# Patient Record
Sex: FEMALE | Race: WHITE | Employment: OTHER | URBAN - METROPOLITAN AREA
[De-identification: names, ages, dates, MRNs, and addresses within clinical notes are randomized per-mention and may not be internally consistent; named-entity substitution may affect disease eponyms.]

---

## 2023-02-13 RX ORDER — BETAXOLOL HYDROCHLORIDE 2.8 MG/ML
1 SUSPENSION/ DROPS OPHTHALMIC 2 TIMES DAILY
COMMUNITY

## 2023-02-13 RX ORDER — LORAZEPAM 1 MG/1
TABLET ORAL
COMMUNITY
Start: 2023-01-19

## 2023-02-13 RX ORDER — SUMATRIPTAN 20 MG/1
SPRAY NASAL
COMMUNITY
Start: 2021-05-29

## 2023-02-14 NOTE — H&P
History and Physical    Patient:  Frandy Villarreal  MRN: [id number]  YOB: 1945    CHIEF COMPLAINT:  Bladder tumor    HISTORY OF PRESENT ILLNESS:   The patient is a 69 y.o. female who presents with bladder tumor    Past Medical History:    Past Medical History:   Diagnosis Date    Arrhythmia     Dr. Grant Martin  saw in 2015 for palpitations and feeling like I was going to pass out\". No longer follows w/ cardiology. No cardiac meds. Arthritis     back    Bursitis of left hip     Chronic back pain     lumbar    COVID-19     1/2022  mild, cold-like symptoms    Glaucoma     Hiatal hernia     Hyperlipidemia     Insomnia     Poor historian     TIA (transient ischemic attack)     Pt states,\"I  may have had a TIA many years ago. \" Never followed up and she is unsure. Under care of team     [address] [facility] [place]  back pain    Under care of team     [facility]    Vasovagal near syncope     Pt denies knowing of diagnosis. She saw Dr. Molly Talavera 2015 as outpatient for \"arrhythmia\". No follow up needed per pt. . States had testing done and no f/u    Vitamin D deficiency     Wellness examination     Dr. Maria Esther Mal[place]  last seen 12/31/2022       Past Surgical History:    Past Surgical History:   Procedure Laterality Date    APPENDECTOMY      CHOLECYSTECTOMY      COLONOSCOPY      ENDOSCOPY, COLON, DIAGNOSTIC      EYE SURGERY      FOOT SURGERY Right     twice    HYSTERECTOMY (CERVIX STATUS UNKNOWN)  1978    KNEE CARTILAGE SURGERY Right     SMALL INTESTINE SURGERY      small bowel resection    TONSILLECTOMY         Medications Prior to Admission:    Prior to Admission medications    Medication Sig Start Date End Date Taking?  Authorizing Provider   betaxolol (BETOPTIC-S) 0.25 % ophthalmic suspension Place 1 drop into both eyes 2 times daily   Yes Historical Provider, MD   Latanoprostene Bunod 0.024 % SOLN Apply 1 drop to eye at bedtime Into affected eye   Yes Historical Provider, MD LORazepam (ATIVAN) 1 MG tablet take 1 tablet by mouth once daily if needed for anxiety 1/19/23  Yes Historical Provider, MD   SUMAtriptan (IMITREX) 20 MG/ACT nasal spray INSTILL 1 SPRAY IN 1 NOSTRIL IF NEEDED AND MAY REPEAT IN 2 HOURS IF NEEDED (MAXIMUM OF 2 SPRAYS IN 24 HOURS)            S) 5/29/21  Yes Historical Provider, MD   HYDROcodone-acetaminophen (NORCO) 5-325 MG per tablet Take 1 tablet by mouth every 6 hours as needed for Pain. Pt states she takes 1-2 q 12 hrs prn  Yousuf Sharif    Historical Provider, MD   esomeprazole Magnesium (NEXIUM) 20 MG PACK Take 40 mg by mouth daily    Historical Provider, MD   estrogens, conjugated, (PREMARIN) 0.625 MG tablet Take 0.625 mg by mouth 3 times/week    Historical Provider, MD       Allergies:  Antihistamines, diphenhydramine-type; Biaxin [clarithromycin]; Ceclor [cefaclor]; Ceftin [cefuroxime]; Cleocin [clindamycin]; Erythromycin; Naprelan [naproxen]; Prednisone; Relafen [nabumetone];  Suprax [cefixime]; and Zocor [simvastatin]    Social History:    Social History     Socioeconomic History    Marital status:      Spouse name: Not on file    Number of children: Not on file    Years of education: Not on file    Highest education level: Not on file   Occupational History    Not on file   Tobacco Use    Smoking status: Never    Smokeless tobacco: Never   Vaping Use    Vaping Use: Never used   Substance and Sexual Activity    Alcohol use: No    Drug use: No    Sexual activity: Not on file   Other Topics Concern    Not on file   Social History Narrative    Not on file     Social Determinants of Health     Financial Resource Strain: Not on file   Food Insecurity: Not on file   Transportation Needs: Not on file   Physical Activity: Not on file   Stress: Not on file   Social Connections: Not on file   Intimate Partner Violence: Not on file   Housing Stability: Not on file       Family History:    Family History   Problem Relation Age of Onset Cancer Mother     Arthritis Mother     Stroke Father     Heart Disease Father     Cancer Father        REVIEW OF SYSTEMS:  Constitutional: negative  Eyes: negative  Respiratory: negative  Cardiovascular: negative  Gastrointestinal: negative  Genitourinary: see HPI  Musculoskeletal: negative  Skin: negative   Neurological: negative  Hematological/Lymphatic: negative  Psychological: negative      Physical Exam:      Patient Vitals for the past 24 hrs:   Height Weight   02/13/23 1434 5' 7.5\" (1.715 m) 168 lb (76.2 kg)     Constitutional: Patient in no acute distress; Neuro: alert and oriented to person place and time. Psych: Mood and affect normal.  Lungs: Clear to auscultation bilaterally. Respiratory effort normal  Cardiovascular: Normal S1 and S2. Normal peripheral pulses. Regular rate. Abdomen: Soft, non-tender, non-distended        LABS:   No results for input(s): WBC, HGB, HCT, MCV, PLT in the last 72 hours. No results for input(s): NA, K, CL, CO2, PHOS, BUN, CREATININE, CA in the last 72 hours. No results found for: PSA    Additional Lab/culture results:    Urinalysis: No results for input(s): COLORU, PHUR, LABCAST, WBCUA, RBCUA, MUCUS, TRICHOMONAS, YEAST, BACTERIA, CLARITYU, SPECGRAV, LEUKOCYTESUR, UROBILINOGEN, BILIRUBINUR, BLOODU in the last 72 hours. Invalid input(s): NITRATE, GLUCOSEUKETONESUAMORPHOUS     -----------------------------------------------------------------  Imaging Results:    Assessment and Plan   Impression:    69 y.o. female with bladder tumor    Plan:   OR today for transurethral resection of bladder tumor.       Cami Waterman PA-C  Urology Service   2/15/2023 8:41 AM

## 2023-02-15 ENCOUNTER — HOSPITAL ENCOUNTER (OUTPATIENT)
Age: 78
Setting detail: OUTPATIENT SURGERY
Discharge: HOME | DRG: 668 | End: 2023-02-15
Attending: UROLOGY | Admitting: UROLOGY
Payer: COMMERCIAL

## 2023-02-15 ENCOUNTER — ANESTHESIA EVENT (OUTPATIENT)
Dept: OPERATING ROOM | Age: 78
End: 2023-02-15
Payer: COMMERCIAL

## 2023-02-15 ENCOUNTER — ANESTHESIA (OUTPATIENT)
Dept: OPERATING ROOM | Age: 78
End: 2023-02-15
Payer: COMMERCIAL

## 2023-02-15 VITALS
HEIGHT: 68 IN | SYSTOLIC BLOOD PRESSURE: 135 MMHG | RESPIRATION RATE: 17 BRPM | OXYGEN SATURATION: 94 % | DIASTOLIC BLOOD PRESSURE: 75 MMHG | BODY MASS INDEX: 25.46 KG/M2 | TEMPERATURE: 97.9 F | WEIGHT: 168 LBS | HEART RATE: 73 BPM

## 2023-02-15 DIAGNOSIS — G89.18 POST-OP PAIN: Primary | ICD-10-CM

## 2023-02-15 DIAGNOSIS — C67.9 MALIGNANT NEOPLASM OF URINARY BLADDER, UNSPECIFIED SITE (HCC): ICD-10-CM

## 2023-02-15 PROCEDURE — 3700000001 HC ADD 15 MINUTES (ANESTHESIA): Performed by: UROLOGY

## 2023-02-15 PROCEDURE — 2580000003 HC RX 258: Performed by: NURSE ANESTHETIST, CERTIFIED REGISTERED

## 2023-02-15 PROCEDURE — 3600000004 HC SURGERY LEVEL 4 BASE: Performed by: UROLOGY

## 2023-02-15 PROCEDURE — 0TBB8ZZ EXCISION OF BLADDER, VIA NATURAL OR ARTIFICIAL OPENING ENDOSCOPIC: ICD-10-PCS | Performed by: UROLOGY

## 2023-02-15 PROCEDURE — 7100000010 HC PHASE II RECOVERY - FIRST 15 MIN: Performed by: UROLOGY

## 2023-02-15 PROCEDURE — 7100000000 HC PACU RECOVERY - FIRST 15 MIN: Performed by: UROLOGY

## 2023-02-15 PROCEDURE — 7100000001 HC PACU RECOVERY - ADDTL 15 MIN: Performed by: UROLOGY

## 2023-02-15 PROCEDURE — 2720000010 HC SURG SUPPLY STERILE: Performed by: UROLOGY

## 2023-02-15 PROCEDURE — 3600000014 HC SURGERY LEVEL 4 ADDTL 15MIN: Performed by: UROLOGY

## 2023-02-15 PROCEDURE — 6360000002 HC RX W HCPCS

## 2023-02-15 PROCEDURE — 2500000003 HC RX 250 WO HCPCS: Performed by: NURSE ANESTHETIST, CERTIFIED REGISTERED

## 2023-02-15 PROCEDURE — 3700000000 HC ANESTHESIA ATTENDED CARE: Performed by: UROLOGY

## 2023-02-15 PROCEDURE — 6360000002 HC RX W HCPCS: Performed by: STUDENT IN AN ORGANIZED HEALTH CARE EDUCATION/TRAINING PROGRAM

## 2023-02-15 PROCEDURE — 88307 TISSUE EXAM BY PATHOLOGIST: CPT

## 2023-02-15 PROCEDURE — 6360000002 HC RX W HCPCS: Performed by: NURSE ANESTHETIST, CERTIFIED REGISTERED

## 2023-02-15 PROCEDURE — 2580000003 HC RX 258: Performed by: UROLOGY

## 2023-02-15 PROCEDURE — 2580000003 HC RX 258: Performed by: STUDENT IN AN ORGANIZED HEALTH CARE EDUCATION/TRAINING PROGRAM

## 2023-02-15 PROCEDURE — 2709999900 HC NON-CHARGEABLE SUPPLY: Performed by: UROLOGY

## 2023-02-15 RX ORDER — CIPROFLOXACIN 500 MG/1
500 TABLET, FILM COATED ORAL 2 TIMES DAILY
Qty: 6 TABLET | Refills: 0 | Status: ON HOLD | OUTPATIENT
Start: 2023-02-15 | End: 2023-02-18 | Stop reason: HOSPADM

## 2023-02-15 RX ORDER — HYOSCYAMINE SULFATE 0.12 MG/1
125 TABLET SUBLINGUAL EVERY 4 HOURS PRN
Qty: 30 EACH | Refills: 0 | Status: CANCELLED | OUTPATIENT
Start: 2023-02-15

## 2023-02-15 RX ORDER — ONDANSETRON 2 MG/ML
INJECTION INTRAMUSCULAR; INTRAVENOUS PRN
Status: DISCONTINUED | OUTPATIENT
Start: 2023-02-15 | End: 2023-02-15 | Stop reason: SDUPTHER

## 2023-02-15 RX ORDER — HYDROCODONE BITARTRATE AND ACETAMINOPHEN 5; 325 MG/1; MG/1
1 TABLET ORAL EVERY 4 HOURS PRN
Qty: 8 TABLET | Refills: 0 | Status: ON HOLD | OUTPATIENT
Start: 2023-02-15 | End: 2023-02-18 | Stop reason: HOSPADM

## 2023-02-15 RX ORDER — CIPROFLOXACIN 2 MG/ML
400 INJECTION, SOLUTION INTRAVENOUS ONCE
Status: COMPLETED | OUTPATIENT
Start: 2023-02-15 | End: 2023-02-15

## 2023-02-15 RX ORDER — SODIUM CHLORIDE 0.9 % (FLUSH) 0.9 %
5-40 SYRINGE (ML) INJECTION PRN
Status: DISCONTINUED | OUTPATIENT
Start: 2023-02-15 | End: 2023-02-15 | Stop reason: HOSPADM

## 2023-02-15 RX ORDER — LIDOCAINE HYDROCHLORIDE 10 MG/ML
INJECTION, SOLUTION EPIDURAL; INFILTRATION; INTRACAUDAL; PERINEURAL PRN
Status: DISCONTINUED | OUTPATIENT
Start: 2023-02-15 | End: 2023-02-15 | Stop reason: SDUPTHER

## 2023-02-15 RX ORDER — HYDROCODONE BITARTRATE AND ACETAMINOPHEN 5; 325 MG/1; MG/1
1 TABLET ORAL EVERY 4 HOURS PRN
Qty: 8 TABLET | Refills: 0 | Status: CANCELLED | OUTPATIENT
Start: 2023-02-15 | End: 2023-02-18

## 2023-02-15 RX ORDER — MIDAZOLAM HYDROCHLORIDE 2 MG/2ML
2 INJECTION, SOLUTION INTRAMUSCULAR; INTRAVENOUS ONCE
Status: COMPLETED | OUTPATIENT
Start: 2023-02-15 | End: 2023-02-15

## 2023-02-15 RX ORDER — SODIUM CHLORIDE, SODIUM LACTATE, POTASSIUM CHLORIDE, CALCIUM CHLORIDE 600; 310; 30; 20 MG/100ML; MG/100ML; MG/100ML; MG/100ML
INJECTION, SOLUTION INTRAVENOUS CONTINUOUS
Status: DISCONTINUED | OUTPATIENT
Start: 2023-02-15 | End: 2023-02-15 | Stop reason: HOSPADM

## 2023-02-15 RX ORDER — ONDANSETRON 2 MG/ML
4 INJECTION INTRAMUSCULAR; INTRAVENOUS
Status: DISCONTINUED | OUTPATIENT
Start: 2023-02-15 | End: 2023-02-15 | Stop reason: HOSPADM

## 2023-02-15 RX ORDER — SODIUM CHLORIDE 0.9 % (FLUSH) 0.9 %
5-40 SYRINGE (ML) INJECTION EVERY 12 HOURS SCHEDULED
Status: DISCONTINUED | OUTPATIENT
Start: 2023-02-15 | End: 2023-02-15 | Stop reason: HOSPADM

## 2023-02-15 RX ORDER — MIDAZOLAM HYDROCHLORIDE 1 MG/ML
INJECTION INTRAMUSCULAR; INTRAVENOUS
Status: COMPLETED
Start: 2023-02-15 | End: 2023-02-15

## 2023-02-15 RX ORDER — MAGNESIUM HYDROXIDE 1200 MG/15ML
LIQUID ORAL CONTINUOUS PRN
Status: DISCONTINUED | OUTPATIENT
Start: 2023-02-15 | End: 2023-02-15 | Stop reason: HOSPADM

## 2023-02-15 RX ORDER — SODIUM CHLORIDE, SODIUM LACTATE, POTASSIUM CHLORIDE, CALCIUM CHLORIDE 600; 310; 30; 20 MG/100ML; MG/100ML; MG/100ML; MG/100ML
INJECTION, SOLUTION INTRAVENOUS CONTINUOUS PRN
Status: DISCONTINUED | OUTPATIENT
Start: 2023-02-15 | End: 2023-02-15 | Stop reason: SDUPTHER

## 2023-02-15 RX ORDER — PROPOFOL 10 MG/ML
INJECTION, EMULSION INTRAVENOUS PRN
Status: DISCONTINUED | OUTPATIENT
Start: 2023-02-15 | End: 2023-02-15 | Stop reason: SDUPTHER

## 2023-02-15 RX ORDER — DEXAMETHASONE SODIUM PHOSPHATE 10 MG/ML
INJECTION, SOLUTION INTRAMUSCULAR; INTRAVENOUS PRN
Status: DISCONTINUED | OUTPATIENT
Start: 2023-02-15 | End: 2023-02-15 | Stop reason: SDUPTHER

## 2023-02-15 RX ORDER — HYDRALAZINE HYDROCHLORIDE 20 MG/ML
10 INJECTION INTRAMUSCULAR; INTRAVENOUS
Status: DISCONTINUED | OUTPATIENT
Start: 2023-02-15 | End: 2023-02-15 | Stop reason: HOSPADM

## 2023-02-15 RX ORDER — MAGNESIUM HYDROXIDE 1200 MG/15ML
LIQUID ORAL PRN
Status: DISCONTINUED | OUTPATIENT
Start: 2023-02-15 | End: 2023-02-15 | Stop reason: HOSPADM

## 2023-02-15 RX ORDER — FENTANYL CITRATE 50 UG/ML
INJECTION, SOLUTION INTRAMUSCULAR; INTRAVENOUS PRN
Status: DISCONTINUED | OUTPATIENT
Start: 2023-02-15 | End: 2023-02-15 | Stop reason: SDUPTHER

## 2023-02-15 RX ORDER — SODIUM CHLORIDE 9 MG/ML
INJECTION, SOLUTION INTRAVENOUS PRN
Status: DISCONTINUED | OUTPATIENT
Start: 2023-02-15 | End: 2023-02-15 | Stop reason: HOSPADM

## 2023-02-15 RX ORDER — CIPROFLOXACIN 500 MG/1
500 TABLET, FILM COATED ORAL 2 TIMES DAILY
Qty: 6 TABLET | Refills: 0 | Status: CANCELLED | OUTPATIENT
Start: 2023-02-15 | End: 2023-02-18

## 2023-02-15 RX ADMIN — PROPOFOL 150 MG: 10 INJECTION, EMULSION INTRAVENOUS at 11:19

## 2023-02-15 RX ADMIN — FENTANYL CITRATE 50 MCG: 50 INJECTION, SOLUTION INTRAMUSCULAR; INTRAVENOUS at 11:17

## 2023-02-15 RX ADMIN — Medication 0.5 MG: at 12:36

## 2023-02-15 RX ADMIN — MIDAZOLAM 2 MG: 1 INJECTION INTRAMUSCULAR; INTRAVENOUS at 10:46

## 2023-02-15 RX ADMIN — FENTANYL CITRATE 50 MCG: 50 INJECTION, SOLUTION INTRAMUSCULAR; INTRAVENOUS at 11:19

## 2023-02-15 RX ADMIN — DEXAMETHASONE SODIUM PHOSPHATE 4 MG: 10 INJECTION INTRAMUSCULAR; INTRAVENOUS at 11:28

## 2023-02-15 RX ADMIN — SODIUM CHLORIDE, POTASSIUM CHLORIDE, SODIUM LACTATE AND CALCIUM CHLORIDE: 600; 310; 30; 20 INJECTION, SOLUTION INTRAVENOUS at 11:12

## 2023-02-15 RX ADMIN — CIPROFLOXACIN 400 MG: 2 INJECTION, SOLUTION INTRAVENOUS at 11:37

## 2023-02-15 RX ADMIN — PHENYLEPHRINE HYDROCHLORIDE 100 MCG: 10 INJECTION INTRAVENOUS at 11:41

## 2023-02-15 RX ADMIN — HYDROMORPHONE HYDROCHLORIDE 0.5 MG: 1 INJECTION, SOLUTION INTRAMUSCULAR; INTRAVENOUS; SUBCUTANEOUS at 12:36

## 2023-02-15 RX ADMIN — LIDOCAINE HYDROCHLORIDE 50 MG: 10 INJECTION, SOLUTION EPIDURAL; INFILTRATION; INTRACAUDAL; PERINEURAL at 11:17

## 2023-02-15 RX ADMIN — PHENYLEPHRINE HYDROCHLORIDE 100 MCG: 10 INJECTION INTRAVENOUS at 11:33

## 2023-02-15 RX ADMIN — MIDAZOLAM HYDROCHLORIDE 2 MG: 2 INJECTION, SOLUTION INTRAMUSCULAR; INTRAVENOUS at 10:46

## 2023-02-15 RX ADMIN — PROPOFOL 150 MG: 10 INJECTION, EMULSION INTRAVENOUS at 11:17

## 2023-02-15 RX ADMIN — PHENYLEPHRINE HYDROCHLORIDE 100 MCG: 10 INJECTION INTRAVENOUS at 11:45

## 2023-02-15 RX ADMIN — SODIUM CHLORIDE, POTASSIUM CHLORIDE, SODIUM LACTATE AND CALCIUM CHLORIDE: 600; 310; 30; 20 INJECTION, SOLUTION INTRAVENOUS at 11:15

## 2023-02-15 RX ADMIN — ONDANSETRON 4 MG: 2 INJECTION INTRAMUSCULAR; INTRAVENOUS at 12:03

## 2023-02-15 RX ADMIN — PHENYLEPHRINE HYDROCHLORIDE 100 MCG: 10 INJECTION INTRAVENOUS at 11:30

## 2023-02-15 RX ADMIN — PHENYLEPHRINE HYDROCHLORIDE 40 MCG/MIN: 10 INJECTION INTRAVENOUS at 11:47

## 2023-02-15 ASSESSMENT — PAIN SCALES - GENERAL
PAINLEVEL_OUTOF10: 9
PAINLEVEL_OUTOF10: 5
PAINLEVEL_OUTOF10: 5

## 2023-02-15 ASSESSMENT — PAIN DESCRIPTION - DESCRIPTORS
DESCRIPTORS: DISCOMFORT
DESCRIPTORS: DISCOMFORT

## 2023-02-15 ASSESSMENT — PAIN DESCRIPTION - LOCATION
LOCATION: VAGINA
LOCATION: GROIN

## 2023-02-15 NOTE — ANESTHESIA POSTPROCEDURE EVALUATION
Department of Anesthesiology  Postprocedure Note    Patient: Chris Holland  MRN: [id number]  YOB: 1945  Date of evaluation: 2/15/2023      Procedure Summary     Date: 02/15/23 Room / Location: [address]    Anesthesia Start: 6074 Anesthesia Stop: 7803    Procedure: CYSTOSCOPY TUR  BLADDER TUMOR Diagnosis:       Malignant neoplasm of urinary bladder, unspecified site [facility])      (BLADDER CANCER)    Surgeons: Son Alicea MD Responsible Provider:     Anesthesia Type: general ASA Status: 3          Anesthesia Type: No value filed.     Shelbie Phase I: Shelbie Score: 10    Shelbie Phase II:        Anesthesia Post Evaluation    Patient location during evaluation: bedside  Patient participation: complete - patient participated  Level of consciousness: awake  Airway patency: patent  Nausea & Vomiting: no nausea and no vomiting  Complications: no  Cardiovascular status: blood pressure returned to baseline  Respiratory status: acceptable  Hydration status: euvolemic  Comments: BP (!) 178/84   Pulse 90   Temp 98.6 °F (37 °C) (Temporal)   Resp 20   Ht 5' 7.5\" (1.715 m)   Wt 168 lb (76.2 kg)   SpO2 100%   BMI 25.92 kg/m²

## 2023-02-15 NOTE — ANESTHESIA PRE PROCEDURE
Department of Anesthesiology  Preprocedure Note       Name:  Paige Heath   Age:  69 y.o.  :  1945                                          MRN:  [id number]         Date:  2/15/2023      Surgeon: Lori Briscoe):  Polly Singh MD    Procedure: Procedure(s):  CYSTOSCOPY TUR  BLADDER TUMOR    Medications prior to admission:   Prior to Admission medications    Medication Sig Start Date End Date Taking? Authorizing Provider   betaxolol (BETOPTIC-S) 0.25 % ophthalmic suspension Place 1 drop into both eyes 2 times daily   Yes Historical Provider, MD   Latanoprostene Bunod 0.024 % SOLN Apply 1 drop to eye at bedtime Into affected eye   Yes Historical Provider, MD   LORazepam (ATIVAN) 1 MG tablet take 1 tablet by mouth once daily if needed for anxiety 23  Yes Historical Provider, MD   SUMAtriptan (IMITREX) 20 MG/ACT nasal spray INSTILL 1 SPRAY IN 1 NOSTRIL IF NEEDED AND MAY REPEAT IN 2 HOURS IF NEEDED (MAXIMUM OF 2 SPRAYS IN 24 HOURS)            S) 21  Yes Historical Provider, MD   HYDROcodone-acetaminophen (NORCO) 5-325 MG per tablet Take 1 tablet by mouth every 6 hours as needed for Pain. Pt states she takes 1-2 q 12 hrs prn  Zackary Maxwell    Historical Provider, MD   esomeprazole Magnesium (NEXIUM) 20 MG PACK Take 40 mg by mouth daily    Historical Provider, MD   estrogens, conjugated, (PREMARIN) 0.625 MG tablet Take 0.625 mg by mouth 3 times/week    Historical Provider, MD       Current medications:    No current facility-administered medications for this encounter.      Current Outpatient Medications   Medication Sig Dispense Refill    betaxolol (BETOPTIC-S) 0.25 % ophthalmic suspension Place 1 drop into both eyes 2 times daily      Latanoprostene Bunod 0.024 % SOLN Apply 1 drop to eye at bedtime Into affected eye      LORazepam (ATIVAN) 1 MG tablet take 1 tablet by mouth once daily if needed for anxiety      SUMAtriptan (IMITREX) 20 MG/ACT nasal spray INSTILL 1 SPRAY IN 1 NOSTRIL IF NEEDED AND MAY REPEAT IN 2 HOURS IF NEEDED (MAXIMUM OF 2 SPRAYS IN 24 HOURS)            S)      HYDROcodone-acetaminophen (NORCO) 5-325 MG per tablet Take 1 tablet by mouth every 6 hours as needed for Pain. Pt states she takes 1-2 q 12 hrs prn  Dartha Phlegm Dr. Anderson Ou      esomeprazole Magnesium (NEXIUM) 20 MG PACK Take 40 mg by mouth daily      estrogens, conjugated, (PREMARIN) 0.625 MG tablet Take 0.625 mg by mouth 3 times/week         Allergies: Allergies   Allergen Reactions    Antihistamines, Diphenhydramine-Type Other (See Comments)     Bp drops and I pass out    Biaxin [Clarithromycin] Hives     Abdominal pain    Ceclor [Cefaclor]      Hives and abdominal pain    Ceftin [Cefuroxime] Other (See Comments)     Hives and abdominal pain    Cleocin [Clindamycin] Other (See Comments)     Hives and abdominal pain    Erythromycin      Hives and abdominal pain    Naprelan [Naproxen] Hives    Prednisone Other (See Comments)     Hives and abdominal pain    Relafen [Nabumetone] Other (See Comments)     Hives and abdominal pain    Suprax [Cefixime] Other (See Comments)     Hives and abdominal pain    Zocor [Simvastatin] Other (See Comments)     Abdominal pain       Problem List:  There is no problem list on file for this patient. Past Medical History:        Diagnosis Date    Arrhythmia     Dr. Eden Cartwright  saw in 2015 for palpitations and feeling like I was going to pass out\". No longer follows w/ cardiology. No cardiac meds.  Arthritis     back    Bursitis of left hip     Chronic back pain     lumbar    COVID-19     1/2022  mild, cold-like symptoms    Glaucoma     Hiatal hernia     Hyperlipidemia     Insomnia     Poor historian     TIA (transient ischemic attack)     Pt states,\"I  may have had a TIA many years ago. \" Never followed up and she is unsure.     Under care of team     [facility] Dr. Dr. Monica Mendez  back pain    Under care of team     [facility]    Vasovagal near syncope     Pt denies knowing of diagnosis. She saw Dr. Jenniffer Jones 2015 as outpatient for \"arrhythmia\". No follow up needed per pt. . States had testing done and no f/u    Vitamin D deficiency     Wellness examination     Dr. Luba Kelly, [place]  last seen 12/31/2022       Past Surgical History:        Procedure Laterality Date    APPENDECTOMY      CHOLECYSTECTOMY      COLONOSCOPY      ENDOSCOPY, COLON, DIAGNOSTIC      EYE SURGERY      FOOT SURGERY Right     twice    HYSTERECTOMY (CERVIX STATUS UNKNOWN)  1978    KNEE CARTILAGE SURGERY Right     SMALL INTESTINE SURGERY      small bowel resection    TONSILLECTOMY         Social History:    Social History     Tobacco Use    Smoking status: Never    Smokeless tobacco: Never   Substance Use Topics    Alcohol use: No                                Counseling given: Not Answered      Vital Signs (Current):   Vitals:    02/13/23 1434   Weight: 168 lb (76.2 kg)   Height: 5' 7.5\" (1.715 m)                                              BP Readings from Last 3 Encounters:   No data found for BP       NPO Status:                                                                                 BMI:   Wt Readings from Last 3 Encounters:   No data found for Wt     Body mass index is 25.92 kg/m². CBC: No results found for: WBC, RBC, HGB, HCT, MCV, RDW, PLT    CMP: No results found for: NA, K, CL, CO2, BUN, CREATININE, GFRAA, AGRATIO, LABGLOM, GLUCOSE, GLU, PROT, CALCIUM, BILITOT, ALKPHOS, AST, ALT    POC Tests: No results for input(s): POCGLU, POCNA, POCK, POCCL, POCBUN, POCHEMO, POCHCT in the last 72 hours.     Coags: No results found for: PROTIME, INR, APTT    HCG (If Applicable): No results found for: PREGTESTUR, PREGSERUM, HCG, HCGQUANT     ABGs: No results found for: PHART, PO2ART, OLY7NHT, VVG0BEE, BEART, O1FSHIOA     Type & Screen (If Applicable):  No results found for: LABABO, LABRH    Drug/Infectious Status (If Applicable):  No results found for: HIV, HEPCAB    COVID-19 Screening (If Applicable): No results found for: COVID19        Anesthesia Evaluation  Patient summary reviewed and Nursing notes reviewed no history of anesthetic complications:   Airway: Mallampati: II  TM distance: >3 FB   Neck ROM: full  Mouth opening: > = 3 FB   Dental: normal exam         Pulmonary:Negative Pulmonary ROS breath sounds clear to auscultation                             Cardiovascular:    (+) hyperlipidemia        Rhythm: regular  Rate: normal                    Neuro/Psych:   (+) TIA, headaches:,             GI/Hepatic/Renal:   (+) hiatal hernia, GERD:,           Endo/Other: Negative Endo/Other ROS                    Abdominal:             Vascular: negative vascular ROS. Other Findings:           Anesthesia Plan      general     ASA 3     (LMA)  Induction: intravenous. MIPS: Postoperative opioids intended and Prophylactic antiemetics administered. Anesthetic plan and risks discussed with patient. Plan discussed with CRNA.                     Jonetta Oppenheim, MD   2/15/2023

## 2023-02-15 NOTE — DISCHARGE INSTRUCTIONS
Transurethral resection of Bladder Tumor: You may see blood in the urine after the procedure. This should resolve over the next couple days. Please stay hydrated. If the blood in the urine becomes significant, and doesn't improve to a clear/pink appearance, please call. You may experience frequency/urgency of urination after the procedure. We expect these symptoms to improve over the next couple weeks. Tylenol for pain control  Patient ok to discharge home in good condition  No heavy lifting, >10 lbs for today  Patient should avoid strenuous activity for today  Patient should walk moderately at home  Patient ok to shower   Patient may resume diet as tolerated  Patient should take Rx as directed  No driving while on narcotics  Please call attending physician or hospital  with questions  Call or Present to ED if fever (> 101F), intractable nausea vomiting or pain. Pt should follow up with Dr. Mark Rachel, call to confirm appointment    Home with manzano catheter. Please teach manzano education and send home with leg and night bag. You may see intermittent blood in the urine while the catheter in place. If the catheter becomes obstructed and needs to be exchanged, please call.

## 2023-02-15 NOTE — PROGRESS NOTES
Gave discharge instructions to pt and family members. All questions answered, verbalized understanding.

## 2023-02-15 NOTE — OP NOTE
Operative Note      Patient: Karena Emerson  YOB: 1945  MRN: [id number]    Date of Procedure: 2/15/2023    Pre-Op Diagnosis: BLADDER CANCER    Post-Op Diagnosis:  Bladder tumor, mediu 2-5 cm        Procedure(s):  CYSTOSCOPY   TUR  BLADDER TUMOR, medium 2-5 cm     Surgeon(s):  Carmen Gray MD    Assistant:   Flakita Thomas   PGY5 URO       Anesthesia: General    Estimated Blood Loss (mL): Minimal    Complications: None    Specimens:   ID Type Source Tests Collected by Time Destination   A : BLADDER TUMOR Tissue Tissue SURGICAL PATHOLOGY Carmen Gray MD 2/15/2023 1151        Implants:  * No implants in log *      Drains: * No LDAs found *    Findings:   Multiple papillary bladder tumors, superficial, collective area of 2 to 5 cm    Indications: Karena Emerson is a 69-year-old female with multiple superficial papillary bladder tumors diagnosed during office cystoscopy. All treatment options were discussed. Risks, benefits, goals, alternatives, possible complications were discussed with patient. Patient elected for above mentioned procedures. Consent was signed. Patient elected to proceed. Details: Patient was brought back to operating room, laid in supine position. EPC cuffs were placed on and functioning prior to induction of anesthesia. Antibiotics were administered. GETA was administered. Patient was positioned in dorsolithotomy position and genitals were prepped and draped in sterile fashion. Time out was performed. We placed visual obturator scope within the urethra and into the bladder. A pan-cystoscopy was performed and showed tumor at the: Just lateral to the left ureteral orifice, posterior wall, dome of the bladder. We switched to a resectoscope. We dissected the tumor superficially making sure not to go deep. For the smaller lesions, we fulgurated. we then obtained adequate hemostasis. The specimen were removed for histopathological examination. She tolerated the procedure well. Dr. Erich Coleman was present and scrubbed in for all critical portions of the procedure  Plan/Follow up:  In 1 to 2 weeks in office for discussion of pathology    Electronically signed by Rex Villagomez MD on 2/15/2023 at 12:08 PM

## 2023-02-16 ENCOUNTER — HOSPITAL ENCOUNTER (INPATIENT)
Age: 78
LOS: 3 days | Discharge: HOME | DRG: 668 | End: 2023-02-19
Attending: HOSPITALIST | Admitting: FAMILY MEDICINE
Payer: COMMERCIAL

## 2023-02-16 DIAGNOSIS — N32.89 EXTRAPERITONEAL RUPTURE OF BLADDER: Primary | ICD-10-CM

## 2023-02-16 PROBLEM — T81.9XXD: Status: ACTIVE | Noted: 2023-02-16

## 2023-02-16 PROBLEM — T81.9XXA POSTOPERATIVE OR SURGICAL COMPLICATION, INITIAL ENCOUNTER: Status: ACTIVE | Noted: 2023-02-16

## 2023-02-16 PROCEDURE — 2060000000 HC ICU INTERMEDIATE R&B

## 2023-02-16 PROCEDURE — 1200000000 HC SEMI PRIVATE

## 2023-02-16 RX ORDER — ONDANSETRON 4 MG/1
4 TABLET, ORALLY DISINTEGRATING ORAL EVERY 8 HOURS PRN
Status: DISCONTINUED | OUTPATIENT
Start: 2023-02-16 | End: 2023-02-19 | Stop reason: HOSPADM

## 2023-02-16 RX ORDER — SODIUM CHLORIDE 9 MG/ML
INJECTION, SOLUTION INTRAVENOUS CONTINUOUS
Status: DISCONTINUED | OUTPATIENT
Start: 2023-02-17 | End: 2023-02-19 | Stop reason: HOSPADM

## 2023-02-16 RX ORDER — ACETAMINOPHEN 650 MG/1
650 SUPPOSITORY RECTAL EVERY 6 HOURS PRN
Status: DISCONTINUED | OUTPATIENT
Start: 2023-02-16 | End: 2023-02-19 | Stop reason: HOSPADM

## 2023-02-16 RX ORDER — ENOXAPARIN SODIUM 100 MG/ML
40 INJECTION SUBCUTANEOUS DAILY
Status: DISCONTINUED | OUTPATIENT
Start: 2023-02-17 | End: 2023-02-19 | Stop reason: HOSPADM

## 2023-02-16 RX ORDER — ACETAMINOPHEN 325 MG/1
650 TABLET ORAL EVERY 6 HOURS PRN
Status: DISCONTINUED | OUTPATIENT
Start: 2023-02-16 | End: 2023-02-19 | Stop reason: HOSPADM

## 2023-02-16 RX ORDER — SODIUM CHLORIDE 0.9 % (FLUSH) 0.9 %
5-40 SYRINGE (ML) INJECTION PRN
Status: DISCONTINUED | OUTPATIENT
Start: 2023-02-16 | End: 2023-02-19 | Stop reason: HOSPADM

## 2023-02-16 RX ORDER — SODIUM CHLORIDE 0.9 % (FLUSH) 0.9 %
5-40 SYRINGE (ML) INJECTION EVERY 12 HOURS SCHEDULED
Status: DISCONTINUED | OUTPATIENT
Start: 2023-02-17 | End: 2023-02-19 | Stop reason: HOSPADM

## 2023-02-16 RX ORDER — POLYETHYLENE GLYCOL 3350 17 G/17G
17 POWDER, FOR SOLUTION ORAL DAILY PRN
Status: DISCONTINUED | OUTPATIENT
Start: 2023-02-16 | End: 2023-02-18

## 2023-02-16 RX ORDER — SODIUM CHLORIDE 9 MG/ML
INJECTION, SOLUTION INTRAVENOUS PRN
Status: DISCONTINUED | OUTPATIENT
Start: 2023-02-16 | End: 2023-02-19 | Stop reason: HOSPADM

## 2023-02-16 RX ORDER — ONDANSETRON 2 MG/ML
4 INJECTION INTRAMUSCULAR; INTRAVENOUS EVERY 6 HOURS PRN
Status: DISCONTINUED | OUTPATIENT
Start: 2023-02-16 | End: 2023-02-19 | Stop reason: HOSPADM

## 2023-02-17 ENCOUNTER — APPOINTMENT (OUTPATIENT)
Dept: CT IMAGING | Age: 78
DRG: 668 | End: 2023-02-17
Attending: HOSPITALIST
Payer: COMMERCIAL

## 2023-02-17 PROBLEM — T81.9XXA POSTOPERATIVE OR SURGICAL COMPLICATION, INITIAL ENCOUNTER: Status: RESOLVED | Noted: 2023-02-16 | Resolved: 2023-02-17

## 2023-02-17 PROBLEM — K56.609 SMALL BOWEL OBSTRUCTION (HCC): Status: ACTIVE | Noted: 2023-02-17

## 2023-02-17 PROBLEM — N32.89 INTRAPERITONEAL RUPTURE OF BLADDER: Status: ACTIVE | Noted: 2023-02-17

## 2023-02-17 PROBLEM — R33.8 ACUTE URINARY RETENTION: Status: ACTIVE | Noted: 2023-02-17

## 2023-02-17 PROBLEM — T81.9XXD: Status: RESOLVED | Noted: 2023-02-16 | Resolved: 2023-02-17

## 2023-02-17 LAB
ABSOLUTE EOS #: 0.05 K/UL (ref 0–0.44)
ABSOLUTE IMMATURE GRANULOCYTE: 0.04 K/UL (ref 0–0.3)
ABSOLUTE LYMPH #: 1.25 K/UL (ref 1.1–3.7)
ABSOLUTE MONO #: 1.16 K/UL (ref 0.1–1.2)
ALBUMIN SERPL-MCNC: 3.2 G/DL (ref 3.5–5.2)
ALBUMIN/GLOBULIN RATIO: 1.4 (ref 1–2.5)
ALP SERPL-CCNC: 90 U/L (ref 35–104)
ALT SERPL-CCNC: 48 U/L (ref 5–33)
ANION GAP SERPL CALCULATED.3IONS-SCNC: 13 MMOL/L (ref 9–17)
AST SERPL-CCNC: 61 U/L
BASOPHILS # BLD: 0 % (ref 0–2)
BASOPHILS ABSOLUTE: 0.03 K/UL (ref 0–0.2)
BILIRUB SERPL-MCNC: 0.9 MG/DL (ref 0.3–1.2)
BUN SERPL-MCNC: 15 MG/DL (ref 8–23)
CALCIUM SERPL-MCNC: 8.5 MG/DL (ref 8.6–10.4)
CHLORIDE SERPL-SCNC: 98 MMOL/L (ref 98–107)
CO2 SERPL-SCNC: 20 MMOL/L (ref 20–31)
CREAT SERPL-MCNC: 1.54 MG/DL (ref 0.5–0.9)
CRP SERPL HS-MCNC: 72.7 MG/L (ref 0–5)
EOSINOPHILS RELATIVE PERCENT: 0 % (ref 1–4)
GFR SERPL CREATININE-BSD FRML MDRD: 35 ML/MIN/1.73M2
GLUCOSE SERPL-MCNC: 122 MG/DL (ref 70–99)
HCT VFR BLD AUTO: 36.9 % (ref 36.3–47.1)
HGB BLD-MCNC: 12 G/DL (ref 11.9–15.1)
IMMATURE GRANULOCYTES: 0 %
INR PPP: 1
LIPASE SERPL-CCNC: 15 U/L (ref 13–60)
LYMPHOCYTES # BLD: 10 % (ref 24–43)
MCH RBC QN AUTO: 31.7 PG (ref 25.2–33.5)
MCHC RBC AUTO-ENTMCNC: 32.5 G/DL (ref 28.4–34.8)
MCV RBC AUTO: 97.4 FL (ref 82.6–102.9)
MONOCYTES # BLD: 10 % (ref 3–12)
NRBC AUTOMATED: 0 PER 100 WBC
PARTIAL THROMBOPLASTIN TIME: 24.6 SEC (ref 20.5–30.5)
PDW BLD-RTO: 12.8 % (ref 11.8–14.4)
PHOSPHATE SERPL-MCNC: 3.8 MG/DL (ref 2.6–4.5)
PLATELET # BLD AUTO: 280 K/UL (ref 138–453)
PMV BLD AUTO: 9.6 FL (ref 8.1–13.5)
POTASSIUM SERPL-SCNC: 3.7 MMOL/L (ref 3.7–5.3)
PROT SERPL-MCNC: 5.5 G/DL (ref 6.4–8.3)
PROTHROMBIN TIME: 10.4 SEC (ref 9.1–12.3)
RBC # BLD: 3.79 M/UL (ref 3.95–5.11)
SEG NEUTROPHILS: 80 % (ref 36–65)
SEGMENTED NEUTROPHILS ABSOLUTE COUNT: 9.66 K/UL (ref 1.5–8.1)
SODIUM SERPL-SCNC: 131 MMOL/L (ref 135–144)
SURGICAL PATHOLOGY REPORT: NORMAL
WBC # BLD AUTO: 12.2 K/UL (ref 3.5–11.3)

## 2023-02-17 PROCEDURE — 6370000000 HC RX 637 (ALT 250 FOR IP): Performed by: INTERNAL MEDICINE

## 2023-02-17 PROCEDURE — 2700000000 HC OXYGEN THERAPY PER DAY

## 2023-02-17 PROCEDURE — 99223 1ST HOSP IP/OBS HIGH 75: CPT | Performed by: INTERNAL MEDICINE

## 2023-02-17 PROCEDURE — 36415 COLL VENOUS BLD VENIPUNCTURE: CPT

## 2023-02-17 PROCEDURE — 72193 CT PELVIS W/DYE: CPT

## 2023-02-17 PROCEDURE — 85025 COMPLETE CBC W/AUTO DIFF WBC: CPT

## 2023-02-17 PROCEDURE — 80053 COMPREHEN METABOLIC PANEL: CPT

## 2023-02-17 PROCEDURE — 85730 THROMBOPLASTIN TIME PARTIAL: CPT

## 2023-02-17 PROCEDURE — 86140 C-REACTIVE PROTEIN: CPT

## 2023-02-17 PROCEDURE — 6360000002 HC RX W HCPCS: Performed by: NURSE PRACTITIONER

## 2023-02-17 PROCEDURE — 1200000000 HC SEMI PRIVATE

## 2023-02-17 PROCEDURE — 81479 UNLISTED MOLECULAR PATHOLOGY: CPT

## 2023-02-17 PROCEDURE — 94761 N-INVAS EAR/PLS OXIMETRY MLT: CPT

## 2023-02-17 PROCEDURE — 6360000004 HC RX CONTRAST MEDICATION: Performed by: STUDENT IN AN ORGANIZED HEALTH CARE EDUCATION/TRAINING PROGRAM

## 2023-02-17 PROCEDURE — 84100 ASSAY OF PHOSPHORUS: CPT

## 2023-02-17 PROCEDURE — 6360000002 HC RX W HCPCS: Performed by: FAMILY MEDICINE

## 2023-02-17 PROCEDURE — 83520 IMMUNOASSAY QUANT NOS NONAB: CPT

## 2023-02-17 PROCEDURE — 88346 IMFLUOR 1ST 1ANTB STAIN PX: CPT

## 2023-02-17 PROCEDURE — 2580000003 HC RX 258: Performed by: NURSE PRACTITIONER

## 2023-02-17 PROCEDURE — 82397 CHEMILUMINESCENT ASSAY: CPT

## 2023-02-17 PROCEDURE — 88350 IMFLUOR EA ADDL 1ANTB STN PX: CPT

## 2023-02-17 PROCEDURE — 85610 PROTHROMBIN TIME: CPT

## 2023-02-17 PROCEDURE — 83690 ASSAY OF LIPASE: CPT

## 2023-02-17 RX ORDER — GLIMEPIRIDE 2 MG/1
1 TABLET ORAL 2 TIMES DAILY
Status: DISCONTINUED | OUTPATIENT
Start: 2023-02-17 | End: 2023-02-19 | Stop reason: HOSPADM

## 2023-02-17 RX ORDER — HYDROCODONE BITARTRATE AND ACETAMINOPHEN 5; 325 MG/1; MG/1
1 TABLET ORAL EVERY 6 HOURS PRN
Status: DISCONTINUED | OUTPATIENT
Start: 2023-02-17 | End: 2023-02-19 | Stop reason: HOSPADM

## 2023-02-17 RX ORDER — HYDROCODONE BITARTRATE AND ACETAMINOPHEN 5; 325 MG/1; MG/1
1 TABLET ORAL EVERY 4 HOURS PRN
Status: DISCONTINUED | OUTPATIENT
Start: 2023-02-17 | End: 2023-02-19 | Stop reason: HOSPADM

## 2023-02-17 RX ORDER — SUMATRIPTAN 20 MG/1
20 SPRAY NASAL
Status: ACTIVE | OUTPATIENT
Start: 2023-02-17 | End: 2023-02-18

## 2023-02-17 RX ORDER — LORAZEPAM 0.5 MG/1
0.5 TABLET ORAL EVERY 6 HOURS PRN
Status: DISCONTINUED | OUTPATIENT
Start: 2023-02-17 | End: 2023-02-19 | Stop reason: HOSPADM

## 2023-02-17 RX ADMIN — DIATRIZOATE MEGLUMINE 300 ML: 300 INJECTION, SOLUTION INTRAVENOUS at 02:27

## 2023-02-17 RX ADMIN — HYDROMORPHONE HYDROCHLORIDE 0.5 MG: 1 INJECTION, SOLUTION INTRAMUSCULAR; INTRAVENOUS; SUBCUTANEOUS at 15:06

## 2023-02-17 RX ADMIN — SODIUM CHLORIDE: 9 INJECTION, SOLUTION INTRAVENOUS at 00:10

## 2023-02-17 RX ADMIN — PIPERACILLIN AND TAZOBACTAM 3375 MG: 3; .375 INJECTION, POWDER, FOR SOLUTION INTRAVENOUS at 00:54

## 2023-02-17 RX ADMIN — TIMOLOL MALEATE 1 DROP: 2.5 SOLUTION OPHTHALMIC at 11:04

## 2023-02-17 RX ADMIN — ONDANSETRON 4 MG: 2 INJECTION INTRAMUSCULAR; INTRAVENOUS at 02:59

## 2023-02-17 RX ADMIN — PIPERACILLIN AND TAZOBACTAM 3375 MG: 3; .375 INJECTION, POWDER, FOR SOLUTION INTRAVENOUS at 07:54

## 2023-02-17 RX ADMIN — SODIUM CHLORIDE, PRESERVATIVE FREE 10 ML: 5 INJECTION INTRAVENOUS at 20:03

## 2023-02-17 RX ADMIN — SODIUM CHLORIDE, PRESERVATIVE FREE 10 ML: 5 INJECTION INTRAVENOUS at 07:56

## 2023-02-17 RX ADMIN — HYDROMORPHONE HYDROCHLORIDE 1 MG: 1 INJECTION, SOLUTION INTRAMUSCULAR; INTRAVENOUS; SUBCUTANEOUS at 16:44

## 2023-02-17 RX ADMIN — HYDROMORPHONE HYDROCHLORIDE 0.5 MG: 1 INJECTION, SOLUTION INTRAMUSCULAR; INTRAVENOUS; SUBCUTANEOUS at 07:54

## 2023-02-17 RX ADMIN — ENOXAPARIN SODIUM 40 MG: 100 INJECTION SUBCUTANEOUS at 07:55

## 2023-02-17 RX ADMIN — HYDROMORPHONE HYDROCHLORIDE 0.5 MG: 1 INJECTION, SOLUTION INTRAMUSCULAR; INTRAVENOUS; SUBCUTANEOUS at 00:55

## 2023-02-17 RX ADMIN — TIMOLOL MALEATE 1 DROP: 2.5 SOLUTION OPHTHALMIC at 20:03

## 2023-02-17 RX ADMIN — HYDROMORPHONE HYDROCHLORIDE 0.5 MG: 1 INJECTION, SOLUTION INTRAMUSCULAR; INTRAVENOUS; SUBCUTANEOUS at 11:43

## 2023-02-17 SDOH — SOCIAL STABILITY: SOCIAL INSECURITY: WITHIN THE LAST YEAR, HAVE YOU BEEN AFRAID OF YOUR PARTNER OR EX-PARTNER?: NO

## 2023-02-17 SDOH — SOCIAL STABILITY: SOCIAL NETWORK: ARE YOU MARRIED, WIDOWED, DIVORCED, SEPARATED, NEVER MARRIED, OR LIVING WITH A PARTNER?: MARRIED

## 2023-02-17 SDOH — SOCIAL STABILITY: SOCIAL NETWORK: HOW OFTEN DO YOU ATTEND CHURCH OR RELIGIOUS SERVICES?: MORE THAN 4 TIMES PER YEAR

## 2023-02-17 SDOH — ECONOMIC STABILITY: TRANSPORTATION INSECURITY
IN THE PAST 12 MONTHS, HAS THE LACK OF TRANSPORTATION KEPT YOU FROM MEDICAL APPOINTMENTS OR FROM GETTING MEDICATIONS?: NO

## 2023-02-17 SDOH — SOCIAL STABILITY: SOCIAL INSECURITY: WITHIN THE LAST YEAR, HAVE YOU BEEN HUMILIATED OR EMOTIONALLY ABUSED IN OTHER WAYS BY YOUR PARTNER OR EX-PARTNER?: NO

## 2023-02-17 SDOH — HEALTH STABILITY: PHYSICAL HEALTH: ON AVERAGE, HOW MANY DAYS PER WEEK DO YOU ENGAGE IN MODERATE TO STRENUOUS EXERCISE (LIKE A BRISK WALK)?: 6 DAYS

## 2023-02-17 SDOH — ECONOMIC STABILITY: FOOD INSECURITY: WITHIN THE PAST 12 MONTHS, THE FOOD YOU BOUGHT JUST DIDN'T LAST AND YOU DIDN'T HAVE MONEY TO GET MORE.: NEVER TRUE

## 2023-02-17 SDOH — SOCIAL STABILITY: SOCIAL NETWORK: HOW OFTEN DO YOU ATTENT MEETINGS OF THE CLUB OR ORGANIZATION YOU BELONG TO?: NEVER

## 2023-02-17 SDOH — ECONOMIC STABILITY: HOUSING INSECURITY: IN THE LAST 12 MONTHS, HOW MANY PLACES HAVE YOU LIVED?: 1

## 2023-02-17 SDOH — ECONOMIC STABILITY: TRANSPORTATION INSECURITY
IN THE PAST 12 MONTHS, HAS LACK OF TRANSPORTATION KEPT YOU FROM MEETINGS, WORK, OR FROM GETTING THINGS NEEDED FOR DAILY LIVING?: NO

## 2023-02-17 SDOH — SOCIAL STABILITY: SOCIAL NETWORK: HOW OFTEN DO YOU GET TOGETHER WITH FRIENDS OR RELATIVES?: MORE THAN THREE TIMES A WEEK

## 2023-02-17 SDOH — SOCIAL STABILITY: SOCIAL INSECURITY
WITHIN THE LAST YEAR, HAVE YOU BEEN KICKED, HIT, SLAPPED, OR OTHERWISE PHYSICALLY HURT BY YOUR PARTNER OR EX-PARTNER?: NO

## 2023-02-17 SDOH — HEALTH STABILITY: MENTAL HEALTH: HOW OFTEN DO YOU HAVE A DRINK CONTAINING ALCOHOL?: NEVER

## 2023-02-17 SDOH — SOCIAL STABILITY: SOCIAL NETWORK
DO YOU BELONG TO ANY CLUBS OR ORGANIZATIONS SUCH AS CHURCH GROUPS UNIONS, FRATERNAL OR ATHLETIC GROUPS, OR SCHOOL GROUPS?: YES

## 2023-02-17 SDOH — HEALTH STABILITY: MENTAL HEALTH
STRESS IS WHEN SOMEONE FEELS TENSE, NERVOUS, ANXIOUS, OR CAN'T SLEEP AT NIGHT BECAUSE THEIR MIND IS TROUBLED. HOW STRESSED ARE YOU?: NOT AT ALL

## 2023-02-17 SDOH — ECONOMIC STABILITY: INCOME INSECURITY: HOW HARD IS IT FOR YOU TO PAY FOR THE VERY BASICS LIKE FOOD, HOUSING, MEDICAL CARE, AND HEATING?: NOT VERY HARD

## 2023-02-17 SDOH — SOCIAL STABILITY: SOCIAL NETWORK
IN A TYPICAL WEEK, HOW MANY TIMES DO YOU TALK ON THE PHONE WITH FAMILY, FRIENDS, OR NEIGHBORS?: MORE THAN THREE TIMES A WEEK

## 2023-02-17 SDOH — SOCIAL STABILITY: SOCIAL NETWORK: ARE YOU MARRIED, WIDOWED, DIVORCED, SEPARATED, NEVER MARRIED, OR LIVING WITH A PARTNER?: DIVORCED

## 2023-02-17 SDOH — HEALTH STABILITY: PHYSICAL HEALTH: ON AVERAGE, HOW MANY MINUTES DO YOU ENGAGE IN EXERCISE AT THIS LEVEL?: 60 MIN

## 2023-02-17 SDOH — ECONOMIC STABILITY: HOUSING INSECURITY
IN THE LAST 12 MONTHS, WAS THERE A TIME WHEN YOU DID NOT HAVE A STEADY PLACE TO SLEEP OR SLEPT IN A SHELTER (INCLUDING NOW)?: NO

## 2023-02-17 SDOH — SOCIAL STABILITY: SOCIAL INSECURITY
WITHIN THE LAST YEAR, HAVE TO BEEN RAPED OR FORCED TO HAVE ANY KIND OF SEXUAL ACTIVITY BY YOUR PARTNER OR EX-PARTNER?: NO

## 2023-02-17 SDOH — ECONOMIC STABILITY: INCOME INSECURITY: IN THE LAST 12 MONTHS, WAS THERE A TIME WHEN YOU WERE NOT ABLE TO PAY THE MORTGAGE OR RENT ON TIME?: NO

## 2023-02-17 SDOH — ECONOMIC STABILITY: FOOD INSECURITY: WITHIN THE PAST 12 MONTHS, YOU WORRIED THAT YOUR FOOD WOULD RUN OUT BEFORE YOU GOT MONEY TO BUY MORE.: NEVER TRUE

## 2023-02-17 SDOH — HEALTH STABILITY: MENTAL HEALTH: HOW MANY STANDARD DRINKS CONTAINING ALCOHOL DO YOU HAVE ON A TYPICAL DAY?: PATIENT DOES NOT DRINK

## 2023-02-17 ASSESSMENT — PAIN SCALES - GENERAL
PAINLEVEL_OUTOF10: 7
PAINLEVEL_OUTOF10: 8
PAINLEVEL_OUTOF10: 7
PAINLEVEL_OUTOF10: 7
PAINLEVEL_OUTOF10: 2
PAINLEVEL_OUTOF10: 7
PAINLEVEL_OUTOF10: 4
PAINLEVEL_OUTOF10: 7

## 2023-02-17 ASSESSMENT — PAIN DESCRIPTION - LOCATION: LOCATION: ABDOMEN

## 2023-02-17 ASSESSMENT — PAIN DESCRIPTION - ORIENTATION: ORIENTATION: MID

## 2023-02-17 NOTE — FLOWSHEET NOTE
Patient resting comfortably, reports pain 2/10. All vitals stable, no complaint or needs, will continue to monitor.

## 2023-02-17 NOTE — H&P
[facility]  Office: [address], DO, Simón Wright, DO, Zenaida Victor, DO, Elisa Meltoncody Doty, DO, Vance Domínguez MD, Salas Haskins MD, Bijal Sanchez MD, Parker Kraus MD,  Danae Galindo MD, Hunter Shen MD, Igor Hernandez, DO, Ngozi Ochoa MD,  Nora Fermin MD, Lázaro Saldivar MD, Og Guillory DO, Amilcar Wick MD, Naldo Casillas MD, Lisa Carter DO, Austin Courtney MD, Soledad Kruse MD, Meredith Ponce MD, Delma Majano MD, Bibiana Lozano DO, Mimi Maier MD, Linda Chowdhury MD, Rambo Berger, CNP,  Rob Tapia, CNP, Neetu Duran, CNP, José Chowdhury, CNP,  Danielle Gelz, [facility], Maximo Mittal, CNP, Chio Roger, CNP, Em Bustamante, CNP, Heide Casper, CNP, Paola Sanchez, CNP, Lukasz Gonzales PA-C, Dunia Taylor, CNS, Nick Alves, CNP, Shyanne Kyle, [facility]    HISTORY AND PHYSICAL EXAMINATION            Date:   2/17/2023  Patient name:  Dwain Dennison  Date of admission:  2/16/2023 11:50 PM  MRN:   [id number]  Account:  [de-identified]  YOB: 1945  PCP:    Binta Mendoza DO  Room:   2003/2003-01  Code Status:    Full Code    Chief Complaint:     Bladder rupture status post bladder biopsy with acute urinary retention with small bowel obstruction and free urine and peritoneal cavity      History Obtained From:     patient    History of Present Illness:     Dwain Dennison is a 69 y.o. Non- / non  female who presents with No chief complaint on file. and is admitted to the hospital for the management of Postoperative or surgical complication, initial encounter. She gives a history of hysterectomy at age 30 for endometriosis. She had small bowel obstruction which started at age 28. She has had problems of the colon, hematuria and had colonoscopy performed with 1 large polyp diagnosed a mild 6 polyps on 2/2022 with repeat colonoscopy planned. Patient had cystoscopy performed with 2 excisional biopsies 1 day ago. After this she was unable to urinate. She had severe lower abdominal pain 7/10 to 10/10 on the night in the lower abdomen and back of sharp stabbing nature which got worse. She was not eating or drinking. No nausea no vomiting. She had not had no bowel movements for the last 4 days. Prior to this she had diarrhea while on Macrodantin for dysuria for 10 days. She was advised to come to the urology office or go to the nearest emergency room. She went to [facility]. CT of abdomen showed mildly dilated loops in the left abdomen, some loops of proximal jejunum thickened wall, left converse lumbar scoliosis with DJD, scattered free fluid in pelvis around bladder and presacral area and right upper quadrant abdomen with small free gas bubbles in the fluid of right bladder. She has been admitted for acute bladder rupture from acute urinary retention status post bladder biopsy with history of multiple surgical interventions of colon and bladder previously with small bowel obstruction and free urine in the peritoneum. Bowel movements will be addressed by general surgery    Past Medical History:     Past Medical History:   Diagnosis Date    Acute urinary retention 2/17/2023    With subsequent post surgical rupture of bladder    Arrhythmia     Dr. Jonh Suero  saw in 2015 for palpitations and feeling like I was going to pass out\". No longer follows w/ cardiology. No cardiac meds. Arthritis     back    Bursitis of left hip     Chronic back pain     lumbar    COVID-19     1/2022  mild, cold-like symptoms    Glaucoma     Hiatal hernia     Hyperlipidemia     Insomnia     Poor historian     Small bowel obstruction ([facility] [address] 2/17/2023    With severe constipation large bowel movement 5 days    TIA (transient ischemic attack)     Pt states,\"I  may have had a TIA many years ago. \" Never followed up and she is unsure.     Under care of team [facility] Dr. Dr. Whitman Curet  back pain    Under care of team     [facility]a Paniagua  GI    Vasovagal near syncope     Pt denies knowing of diagnosis. She saw Dr. Saúl Norton 2015 as outpatient for \"arrhythmia\". No follow up needed per pt. . States had testing done and no f/u    Vitamin D deficiency     Wellness examination     Dr. Jodie Cano, [place]  last seen 12/31/2022        Past Surgical History:     Past Surgical History:   Procedure Laterality Date    APPENDECTOMY      CHOLECYSTECTOMY      COLONOSCOPY      CYSTOSCOPY N/A 02/15/2023    CYSTOSCOPY TUR  BLADDER TUMOR    CYSTOSCOPY N/A 2/15/2023    CYSTOSCOPY TUR  BLADDER TUMOR performed by Olvin Martinez MD at [address] [facility], [place], [address] [facility] Right     twice    HYSTERECTOMY (CERVIX STATUS UNKNOWN)  1978    KNEE CARTILAGE SURGERY Right     SMALL INTESTINE SURGERY      small bowel resection    TONSILLECTOMY          Medications Prior to Admission:     Prior to Admission medications    Medication Sig Start Date End Date Taking? Authorizing Provider   HYDROcodone-acetaminophen (NORCO) 5-325 MG per tablet Take 1 tablet by mouth every 4 hours as needed for Pain for up to 3 days. Intended supply: 3 days.  Take lowest dose possible to manage pain Max Daily Amount: 6 tablets 2/15/23 2/18/23  Waleska Cameron PA-C   ciprofloxacin (CIPRO) 500 MG tablet Take 1 tablet by mouth 2 times daily for 3 days 2/15/23 2/18/23  Waleska Cameron PA-C   betaxolol (BETOPTIC-S) 0.25 % ophthalmic suspension Place 1 drop into both eyes 2 times daily    Historical Provider, MD   Latanoprostene Bunod 0.024 % SOLN Apply 1 drop to eye at bedtime Into affected eye  Patient not taking: Reported on 2/17/2023    Historical Provider, MD   LORazepam (ATIVAN) 1 MG tablet take 1 tablet by mouth once daily if needed for anxiety 1/19/23   Historical Provider, MD   SUMAtriptan (IMITREX) 20 MG/ACT nasal spray INSTILL 1 SPRAY IN 1 NOSTRIL IF NEEDED AND MAY REPEAT IN 2 HOURS IF NEEDED (MAXIMUM OF 2 SPRAYS IN 24 HOURS)            S) 5/29/21   Historical Provider, MD   HYDROcodone-acetaminophen (NORCO) 5-325 MG per tablet Take 1 tablet by mouth every 6 hours as needed for Pain. Pt states she takes 1-2 q 12 hrs prn  Manuel Waller    Historical Provider, MD   esomeprazole Magnesium (NEXIUM) 20 MG PACK Take 40 mg by mouth daily    Historical Provider, MD   estrogens, conjugated, (PREMARIN) 0.625 MG tablet Take 0.625 mg by mouth 3 times/week    Historical Provider, MD        Allergies:     Antihistamines, diphenhydramine-type; Biaxin [clarithromycin]; Ceclor [cefaclor]; Ceftin [cefuroxime]; Cleocin [clindamycin]; Erythromycin; Naprelan [naproxen]; Prednisone; Relafen [nabumetone]; Suprax [cefixime]; and Zocor [simvastatin]    Social History:     Tobacco:    reports that she has never smoked. She has never used smokeless tobacco.  Alcohol:      reports no history of alcohol use. Drug Use:  reports no history of drug use. Family History:     Family History   Problem Relation Age of Onset    Cancer Mother     Arthritis Mother     Stroke Father     Heart Disease Father     Cancer Father        Review of Systems:     Positive and Negative as described in HPI.     CONSTITUTIONAL:  negative for fevers, chills, sweats, +fatigue, weight loss  HEENT:  negative for vision, hearing changes, runny nose, throat pain  RESPIRATORY:  negative for shortness of breath, cough, congestion, wheezing  CARDIOVASCULAR:  negative for chest pain, palpitations  GASTROINTESTINAL:  negative for nausea, vomiting, diarrhea, +constipation, change in bowel habits, +abdominal pain   GENITOURINARY:  negative for difficulty of urination, burning with urination, frequency   INTEGUMENT:  negative for rash, skin lesions, easy bruising   HEMATOLOGIC/LYMPHATIC:  negative for swelling/edema   ALLERGIC/IMMUNOLOGIC:  negative for urticaria , itching  ENDOCRINE:  negative increase in drinking, increase in urination, hot or cold intolerance  MUSCULOSKELETAL:  negative joint pains, muscle aches, swelling of joints  NEUROLOGICAL:  negative for headaches, dizziness, lightheadedness, numbness, pain, tingling extremities  BEHAVIOR/PSYCH:  negative for depression, anxiety    Physical Exam:   BP (!) 143/72   Pulse 94   Temp 98 °F (36.7 °C) (Oral)   Resp 16   Ht 5' 7\" (1.702 m)   Wt 174 lb (78.9 kg)   SpO2 96%   BMI 27.25 kg/m²   Temp (24hrs), Av °F (36.7 °C), Min:98 °F (36.7 °C), Max:98 °F (36.7 °C)    No results for input(s): POCGLU in the last 72 hours. No intake or output data in the 24 hours ending 23 0218    General Appearance:awake, ill appearing, and in no acute distress  Mental status: oriented to person, place, and time  Head: normocephalic, atraumatic  Eye: no icterus, redness, pupils equal and reactive, extraocular eye movements intact, conjunctiva clear  Ear: normal external ear, no discharge, hearing intact  Nose: no drainage noted  Mouth: mucous membranes moist  Neck: supple, no carotid bruits, thyroid not palpable  Lungs: Bilateral equal air entry, clear to ausculation, no wheezing, rales or rhonchi, normal effort  Cardiovascular: normal rate, regular rhythm, no murmur, gallop, rub  Abdomen: firm,, tender, distended,+bowel sounds, with rebound tenderness no hepatomegaly or splenomegaly  Neurologic: There are no new focal motor or sensory deficits, normal muscle tone and bulk, no abnormal sensation, normal speech, cranial nerves II through XII grossly intact  Skin: No gross lesions, rashes, bruising or bleeding on exposed skin area  Extremities: peripheral pulses palpable, no pedal edema or calf pain with palpation  Psych: normal affect    Investigations:      Laboratory Testing:  No results found for this or any previous visit (from the past 24 hour(s)). Imaging/Diagnostics:  No results found.     Assessment :      Hospital Problems             Last Modified POA    Acute urinary retention 2/17/2023 Yes    Overview Signed 2/17/2023  1:49 AM by Marilee Mccauley MD     With subsequent post surgical rupture of bladder         Small bowel obstruction ([address] 2/17/2023 Yes    Overview Signed 2/17/2023  1:49 AM by Marilee Mccauley MD     With severe constipation large bowel movement 5 days        This is a 71-year-old female with history of hysterectomy and small bowel obstruction starting when she was 29 who had 2 bladder polyps biopsied 1 day ago. She had postop acute urinary retention which caused bladder rupture with free urine and peritoneum. History of constipation for 4 days with previous history of diarrhea while on Macrodantin. General surgery and urology consulted  Principal diagnosis  #Acute urinary bladder rupture secondary to acute urinary retention and bladder biopsy-urology consulted  Active diagnoses  #Possible small bowel obstruction with free urine in peritoneal cavity-General surgery consulted n.p.o. on IV Zosyn  Significant past history of intestinal complaints with swelling of proximal jejunum-stool for H. pylori, IBD panel lipase. #Constipation-we will defer to general surgery for management  Plan:     Patient status inpatient in the Progressive Unit/Step down    N.p.o.  #IV fluids, IV Zosyn    DVT prophylaxis-Lovenox subcu  Full code resuscitation status  Condition guarded  disPosition discharge home  Consultations:   [address] CONSULT TO UROLOGY     Patient is admitted as inpatient status because of co-morbidities listed above, severity of signs and symptoms as outlined, requirement for current medical therapies and most importantly because of direct risk to patient if care not provided in a hospital setting. Expected length of stay > 48 hours.     Marilee Mccauley MD  2/17/2023  2:18 AM    Copy sent to Dr. Saji Gastelum, DO

## 2023-02-17 NOTE — PROGRESS NOTES
Tolerating clears, minimal nausea  Scanning reviewed- no surgical indications for small bowel obstruction. Recommend treatment for ileus  `advance diet as tolerated, minimize narcotics, treat underlying bladder issues, ambulate  If she has vomiting or symptoms consistent with ileus- a ng tube could be placed for decompression.   She has constipation on scanning and needs bowel regimen- if needed use suppository  Please call us back as needed

## 2023-02-17 NOTE — PLAN OF CARE
[facility]  Office: [address], DO, Shameka Willis, DO, Robert Engel, DO, Mag Doty, DO, Jessica Paris MD, Larry Kessler MD, Jose Roberto Buchanan MD, Stanley Peres MD,  Cheko Canela MD, Xiomara Amador MD, Mary Erickson, DO, Eugene Stewart MD,  Gisell Holcomb, DO, Ron Loyd MD, Doc Monroy MD, Susan Lowe DO, Jose R Armstrong MD, Reji Garay MD, César Torrez DO, Bhumi Woods MD, Flory Wick MD, Pasha Garcia MD, Kamini Mckenzie MD, Ima Styles DO, Abilio Sidhu MD, Joaquin Harvey MD, Lenny Salazar, Dima Zavala, CNP, Grant Middleton, CNP, Teo Stokes, CNP,  Shreyas Tapia, [facility], Fadumo Mendez, CNP, Regina Aguero, CNP, Grayson Gemini, CNP, Darell Hogan, CNP, Michela Bernal, CNP, Jovanni Fernando PA-C, Naseem Pineda, CNS, Dakotah Kay, CNP, Laura Perez, [address] [place] [address]    Second Visit Note  For more detailed information please refer to the progress note of the day      2/17/2023    4:22 PM    Name:   Dionne Edmonds  MRN:     [id number]     [place]:      [de-identified]   Room:   2003/2003-01  IP Day:  1  Admit Date:  2/16/2023 11:50 PM    PCP:   Emily Dwyer DO  Code Status:  Full Code      Pt vitals were reviewed   New labs were reviewed   Patient was seen    Updated plan :     Patient presented to emergency room at [facility] with severe abdominal pain. She has history of bladder tumors and had cystoscopy as outpatient with her urologist.  Patient reported that she has been getting cystoscopy as a routine monitoring of her bladder tumors which had been low-grade bladder cancer. She is not on any chemotherapy currently. Patient had multiple abdominal surgeries before including for bowel obstructions, hysterectomy, oophorectomy for endometriosis, appendectomy. patient also has underlying history of glaucoma, hyperlipidemia.   Evaluation at [facility] with CT abdomen showed mildly dilated loops and free fluid in pelvis around the bladder and presacral area concern for bladder leak and perforation. Patient also found to have elevated creatinine 2.62, leukocytosis. Patient is uncomfortable with pain. She has mild appetite and wants to eat. No surgical plan by urology. Start clear liquid diet. Patient has guarding. Continue IV Zosyn.     Francisco Spain MD  2/17/2023  4:22 PM

## 2023-02-17 NOTE — CONSULTS
Vivian Wilkins, Erick Ansari, Raji Simmons, Sheeba Villanueva., [address], [address]   Urology Consultation      Patient:  Miguel Mcneil  MRN: [id number]  YOB: 1945    CHIEF COMPLAINT:  Concern for bladder perforation after TURBT    HISTORY OF PRESENT ILLNESS:   The patient is a 69 y.o. female who presents with worsening abdominal pain after TURBT on 2/15/2023. Presented to [place] [address] ED, CT abdomen pelvis without contrast showed evidence of intraperitoneal bladder perforation. Patient denies any fevers or chills. Endorses some nausea has been dry heaving. Pain now is controlled with pain medications but gets worse with time after last pain med administration. Cr 2.62 (unknown baseline). Patient's old records, notes and chart reviewed and summarized above. Past Medical History:    Past Medical History:   Diagnosis Date    Arrhythmia     Dr. Sabiha Norwood  saw in 2015 for palpitations and feeling like I was going to pass out\". No longer follows w/ cardiology. No cardiac meds. Arthritis     back    Bursitis of left hip     Chronic back pain     lumbar    COVID-19     1/2022  mild, cold-like symptoms    Glaucoma     Hiatal hernia     Hyperlipidemia     Insomnia     Poor historian     TIA (transient ischemic attack)     Pt states,\"I  may have had a TIA many years ago. \" Never followed up and she is unsure. Under care of team     [address] pain [facility] Dr. Dr. Margarette Spatz  back pain    Under care of team     Lorraine Whittaker  GI    Vasovagal near syncope     Pt denies knowing of diagnosis. She saw Dr. Enriqueta Mackey 2015 as outpatient for \"arrhythmia\". No follow up needed per pt.  . States had testing done and no f/u    Vitamin D deficiency     Wellness examination     Dr. Albin Cadet, [place]  last seen 12/31/2022       Past Surgical History:    Past Surgical History:   Procedure Laterality Date    APPENDECTOMY      CHOLECYSTECTOMY      COLONOSCOPY      CYSTOSCOPY N/A 02/15/2023    CYSTOSCOPY TUR  BLADDER TUMOR CYSTOSCOPY N/A 2/15/2023    CYSTOSCOPY TUR  BLADDER TUMOR performed by Luis Escobar MD at [address] [facility], COLON, DIAGNOSTIC      EYE SURGERY      FOOT SURGERY Right     twice    HYSTERECTOMY (CERVIX STATUS UNKNOWN)  1978    KNEE CARTILAGE SURGERY Right     SMALL INTESTINE SURGERY      small bowel resection    TONSILLECTOMY         Medications:      Current Facility-Administered Medications:     HYDROmorphone (DILAUDID) injection 0.25 mg, 0.25 mg, IntraVENous, Q3H PRN **OR** HYDROmorphone (DILAUDID) injection 0.5 mg, 0.5 mg, IntraVENous, Q3H PRN, Jason Flash, APRN - CNP    sodium chloride flush 0.9 % injection 5-40 mL, 5-40 mL, IntraVENous, 2 times per day, Jason Flash, APRN - CNP    sodium chloride flush 0.9 % injection 5-40 mL, 5-40 mL, IntraVENous, PRN, Jason Flash, APRN - CNP    0.9 % sodium chloride infusion, , IntraVENous, PRN, Jason Flash, APRN - CNP    enoxaparin (LOVENOX) injection 40 mg, 40 mg, SubCUTAneous, Daily, Jason Flash, APRN - CNP    ondansetron (ZOFRAN-ODT) disintegrating tablet 4 mg, 4 mg, Oral, Q8H PRN **OR** ondansetron (ZOFRAN) injection 4 mg, 4 mg, IntraVENous, Q6H PRN, Jason Flash, APRN - CNP    acetaminophen (TYLENOL) tablet 650 mg, 650 mg, Oral, Q6H PRN **OR** acetaminophen (TYLENOL) suppository 650 mg, 650 mg, Rectal, Q6H PRN, Jason Flash, APRN - CNP    polyethylene glycol (GLYCOLAX) packet 17 g, 17 g, Oral, Daily PRN, Jason Flash, APRN - CNP    0.9 % sodium chloride infusion, , IntraVENous, Continuous, Jason Flash, APRN - CNP, Last Rate: 75 mL/hr at 02/17/23 0010, New Bag at 02/17/23 0010    piperacillin-tazobactam (ZOSYN) 3,375 mg in sodium chloride 0.9 % 50 mL IVPB (mini-bag), 3,375 mg, IntraVENous, Q8H, Jason Flash, APRN - CNP    Allergies: Allergies   Allergen Reactions    Antihistamines, Diphenhydramine-Type Other (See Comments)     Bp drops and I pass out.  If given in large amount    Biaxin [Clarithromycin] Hives     Abdominal pain    Ceclor [Cefaclor]      Hives and abdominal pain    Ceftin [Cefuroxime] Other (See Comments)     Hives and abdominal pain    Cleocin [Clindamycin] Other (See Comments)     Hives and abdominal pain    Erythromycin      Hives and abdominal pain    Naprelan [Naproxen] Hives    Prednisone Other (See Comments)     Hives and abdominal pain    Relafen [Nabumetone] Other (See Comments)     Hives and abdominal pain    Suprax [Cefixime] Other (See Comments)     Hives and abdominal pain    Zocor [Simvastatin] Other (See Comments)     Abdominal pain       Social History:   Social History     Socioeconomic History    Marital status:      Spouse name: Not on file    Number of children: Not on file    Years of education: Not on file    Highest education level: Not on file   Occupational History    Not on file   Tobacco Use    Smoking status: Never    Smokeless tobacco: Never   Vaping Use    Vaping Use: Never used   Substance and Sexual Activity    Alcohol use: No    Drug use: No    Sexual activity: Not on file   Other Topics Concern    Not on file   Social History Narrative    Not on file     Social Determinants of Health     Financial Resource Strain: Low Risk     Difficulty of Paying Living Expenses: Not very hard   Food Insecurity: No Food Insecurity    Worried About Running Out of Food in the Last Year: Never true    Ran Out of Food in the Last Year: Never true   Transportation Needs: No Transportation Needs    Lack of Transportation (Medical): No    Lack of Transportation (Non-Medical): No   Physical Activity: Sufficiently Active    Days of Exercise per Week: 6 days    Minutes of Exercise per Session: 60 min   Stress: No Stress Concern Present    Feeling of Stress : Not at all   Social Connections:  Moderately Integrated    Frequency of Communication with Friends and Family: More than three times a week    Frequency of Social Gatherings with Friends and Family: More than three times a week    Attends Sabian[employer] Services: More than 4 times per year    Active Member of Clubs or Organizations: Yes    Attends Club or Organization Meetings: Never    Marital Status:    Intimate Partner Violence: Not At Risk    Fear of Current or Ex-Partner: No    Emotionally Abused: No    Physically Abused: No    Sexually Abused: No   Housing Stability: Low Risk     Unable to Pay for Housing in the Last Year: No    Number of Places Lived in the Last Year: 1    Unstable Housing in the Last Year: No       Family History:    Family History   Problem Relation Age of Onset    Cancer Mother     Arthritis Mother     Stroke Father     Heart Disease Father     Cancer Father        REVIEW OF SYSTEMS:  A comprehensive 14 point review of systems was obtained. Constitutional: No fatigue   Ears, nose, mouth, throat, face: No ringing in the ears; no facial droop. Respiratory: No cough or cold. Cardiovascular: No palpitations  Gastrointestinal: No diarrhea or constipation. Genitourinary: No burning with urination  Integument/Skin: No rashes  Hematologic/Lymphatic: No easy bruising  Musculoskeletal: No muscle pains  Neurologic: No weakness in the extremities. Physical Exam:      This a 69 y.o. female   There were no vitals filed for this visit. Constitutional: Patient in no acute distress. Neuro: alert and oriented to person place and time. Head: Atraumatic and normocephalic. Neck: Trachea midline. Ext: 2+ radial pulses bilaterally. Psych: Mood and affect normal.  Skin: No rashes or bruising present. Lungs: Respiratory effort normal.  Cardiovascular:  Regular rhythm. Abdomen: Soft, tender to palpation in lower abdomen, mildly firm. Bladder non-tender and not distended. Lymphatics: no palpable lymphadenopathy  Pelvic exam: deferred. Rectal exam not indicated. Labs:  No results for input(s): WBC, HGB, HCT, MCV, PLT in the last 72 hours.   No results for input(s): NA, K, CL, CO2, PHOS, BUN, CREATININE, CA in the last 72 hours. No results for input(s): COLORU, PHUR, LABCAST, WBCUA, RBCUA, MUCUS, TRICHOMONAS, YEAST, BACTERIA, CLARITYU, SPECGRAV, LEUKOCYTESUR, UROBILINOGEN, Willene So in the last 72 hours. Invalid input(s): NITRATE, GLUCOSEUKETONESUAMORPHOUS      -----------------------------------------------------------------  Imaging Results:  No results found. Assessment and Plan   Impression: 68 yo female with  Possible bladder perforation  Bladder cancer s/p TURBT  Patient Active Problem List   Diagnosis    Postoperative or surgical complication, initial encounter    Postoperative or surgical complication, subsequent encounter       Plan:   Stat CT cystogram  NPO  Possible OR for open repair of bladder rupture  Continue IV antibiotics - on rosemarie Gonzalez MD  Urology Resident, PGY-3  12:46 AM 2/17/2023

## 2023-02-17 NOTE — CONSULTS
General Surgery  Consult    PATIENT NAME: Miguel Mcneil  AGE: 69 y.o. MEDICAL RECORD NO. [id number]  DATE: 2/17/2023  SURGEON: Dr. Iggy Villegas: Jackie Sheets DO    Patient evaluated at the request of  Dr. Nahun Chiang  Reason for evaluation: SBO    Patient information was obtained from patient and past medical records. History/Exam limitations: none. IMPRESSION:     Patient Active Problem List   Diagnosis    Acute urinary retention    Small bowel obstruction [facility])       60-year-old female with an extraperitoneal bladder rupture s/p transurethral bladder tumor resection and CT findings concerning for partial bowel obstruction    PLAN:   -Patient seen and evaluated. History, physical exam, laboratory, and radiographic findings reviewed and discussed with attending.  -No acute surgical intervention indicated at this time.  -Patient not currently complaining of nausea. In the event that patient should become nauseous and/or vomit, NG tube placement would be indicated for gastric decompression.  -Recommend aggressive bowel regimen.  -Monitor electrolytes. Replace as necessary.  -Pain and nausea control as needed  -Monitor vitals per unit standard  -Encourage I-S, deep breathing, and cough  -Strict I/O's  -Diet: N.p.o.  -Remainder of plan and disposition per primary team.        HISTORY:   History of Chief Complaint:    Miguel Mcneil is a 69 y.o. female who presents with 1 day of abdominal pain and nausea. Patient had a recent cystoscopy with transurethral bladder tumor resection performed on 2/15/2023. Patient returned 2 days later complaining of sharp abdominal pain and nausea. Patient originally presented to her urology office, at which time she was advised to go to the nearest emergency department. Patient originally went to [place] [address].   CT scan obtained there showed mildly dilated loops of colon the left abdomen, proximal jejunal wall thickening, and free fluid in the pelvis around the bladder and presacral area. Patient was then transferred to [facility] due to concern for bladder rupture for further evaluation and treatment. General surgery was consulted due to concern for partial small bowel obstruction. Patient has had multiple previous abdominal surgeries and endorses history of previous small bowel obstruction that occurred at age 28. Patient states that her abdominal pain is mostly lower abdominal pain and does not resemble the abdominal pain she experienced with previous bowel obstruction. Patient states that she has not had a bowel movement since Monday, before which she was having severe diarrhea. Patient denies vomiting, hematemesis, blood in stool, fever, chills, chest pain, shortness of breath, numbness, tingling. At the time of evaluation, patient denies any nausea. Will defer NG tube decompression at this time. Plan to continue to monitor abdominal exam, aggressive bowel regimen, and keep patient NPO. Patient expressed and agreement with the plan as discussed. Past Medical History   has a past medical history of Acute urinary retention, Arrhythmia, Arthritis, Bursitis of left hip, Chronic back pain, COVID-19, Glaucoma, Hiatal hernia, Hyperlipidemia, Insomnia, Poor historian, Small bowel obstruction ([facility] [address] TIA (transient ischemic attack), Under care of team, Under care of team, Vasovagal near syncope, Vitamin D deficiency, and Wellness examination. Past Surgical History   has a past surgical history that includes Hysterectomy (1978); Small intestine surgery; Cholecystectomy; Appendectomy; Tonsillectomy; eye surgery; Foot surgery (Right); Colonoscopy; Endoscopy, colon, diagnostic; Knee cartilage surgery (Right); Cystoscopy (N/A, 02/15/2023); and Cystoscopy (N/A, 2/15/2023). Medications  Prior to Admission medications    Medication Sig Start Date End Date Taking?  Authorizing Provider   HYDROcodone-acetaminophen (NORCO) 5-325 MG per tablet Take 1 tablet by mouth every 4 hours as needed for Pain for up to 3 days. Intended supply: 3 days. Take lowest dose possible to manage pain Max Daily Amount: 6 tablets 2/15/23 2/18/23  Waleska Cameron PA-C   ciprofloxacin (CIPRO) 500 MG tablet Take 1 tablet by mouth 2 times daily for 3 days 2/15/23 2/18/23  Waleska Cameron PA-C   betaxolol (BETOPTIC-S) 0.25 % ophthalmic suspension Place 1 drop into both eyes 2 times daily    Historical Provider, MD   Latanoprostene Bunod 0.024 % SOLN Apply 1 drop to eye at bedtime Into affected eye  Patient not taking: Reported on 2/17/2023    Historical Provider, MD   LORazepam (ATIVAN) 1 MG tablet take 1 tablet by mouth once daily if needed for anxiety 1/19/23   Historical Provider, MD   SUMAtriptan (IMITREX) 20 MG/ACT nasal spray INSTILL 1 SPRAY IN 1 NOSTRIL IF NEEDED AND MAY REPEAT IN 2 HOURS IF NEEDED (MAXIMUM OF 2 SPRAYS IN 24 HOURS)            S) 5/29/21   Historical Provider, MD   HYDROcodone-acetaminophen (NORCO) 5-325 MG per tablet Take 1 tablet by mouth every 6 hours as needed for Pain. Pt states she takes 1-2 q 12 hrs prn  Ladonna Bosch    Historical Provider, MD   esomeprazole Magnesium (NEXIUM) 20 MG PACK Take 40 mg by mouth daily    Historical Provider, MD   estrogens, conjugated, (PREMARIN) 0.625 MG tablet Take 0.625 mg by mouth 3 times/week    Historical Provider, MD    Scheduled Meds:   timolol  1 drop Both Eyes BID    [Held by provider] esomeprazole  40 mg Oral Daily    [Held by provider] estrogens (conjugated)  0.625 mg Oral Daily    sodium chloride flush  5-40 mL IntraVENous 2 times per day    enoxaparin  40 mg SubCUTAneous Daily    piperacillin-tazobactam  3,375 mg IntraVENous Q8H     Continuous Infusions:   sodium chloride      sodium chloride 75 mL/hr at 02/17/23 0010     PRN Meds:. HYDROmorphone **OR** HYDROmorphone, [Held by provider] HYDROcodone-acetaminophen, [Held by provider] HYDROcodone-acetaminophen, [Held by provider] LORazepam, [Held by provider] SUMAtriptan, sodium chloride flush, sodium chloride, ondansetron **OR** ondansetron, acetaminophen **OR** acetaminophen, polyethylene glycol  Allergies  is allergic to antihistamines, diphenhydramine-type; biaxin [clarithromycin]; ceclor [cefaclor]; ceftin [cefuroxime]; cleocin [clindamycin]; erythromycin; naprelan [naproxen]; prednisone; relafen [nabumetone]; suprax [cefixime]; and zocor [simvastatin]. Family History  family history includes Arthritis in her mother; Cancer in her father and mother; Heart Disease in her father; Stroke in her father. Social History   reports that she has never smoked. She has never used smokeless tobacco.   reports no history of alcohol use. reports no history of drug use. Review of Systems  General Denies any fever or chills  Resp Denies any shortness of breath, cough or wheezing  Cardiac Denies any chest pain, palpitations, claudication or edema  GI Reports lower abdominal pain, constipation. Denies nausea, vomiting, hematemesis, blood in stool.  Reports history of bladder tumor  Heme Denies bruising or bleeding easily  Endocrine Denies any history of diabetes or thyroid disease  Neuro Denies any focal motor or sensory deficits    PHYSICAL:   VITALS:  height is 5' 7\" (1.702 m) and weight is 174 lb (78.9 kg). Her oral temperature is 98 °F (36.7 °C). Her blood pressure is 143/72 (abnormal) and her pulse is 94. Her respiration is 16 and oxygen saturation is 96%. CONSTITUTIONAL: Alert and oriented times 3, no acute distress and cooperative to examination. HEENT: Head is normocephalic, atraumatic. EOMI  NECK: Soft, trachea midline and straight  LUNGS: Normal respiratory effort, no accessory muscle use, no wheezing or stridor. CARDIOVASCULAR: Regular rate and rhythm. ABDOMEN: Soft, mildly distended, moderately tender to palpation diffusely, worse in the lower quadrants, no palpable masses or hernias, no rebound or guarding.   Scars consistent with surgical history. NEUROLOGIC: There are no focalizing motor or sensory deficits  EXTREMITIES: no cyanosis, clubbing or edema    LABS:   No results for input(s): WBC, HGB, HCT, PLT, NA, K, CL, CO2, BUN, CREATININE, MG, PHOS, CALCIUM, PTT, INR, AST, ALT, BILITOT, BILIDIR, NITRU, COLORU, BACTERIA in the last 72 hours. Invalid input(s): PT, WBCU, RBCU, LEUKOCYTESUA  No results for input(s): ALKPHOS, ALT, AST, BILITOT, BILIDIR, LABALBU, AMYLASE, LIPASE in the last 72 hours. RADIOLOGY:   CT CYSTOGRAM W CONTRAST    Result Date: 2/17/2023  Extraperitoneal bladder rupture. Thank you for the interesting evaluation. Further recommendations to follow.     Ryan Love DO  2/17/2023, 3:11 AM

## 2023-02-17 NOTE — PROGRESS NOTES
Urology Progress Note    Subjective: Nicky Luis is a 69 y.o. female. His/Her current Diet is: Diet NPO. Since the previous note, the patient reports the following:  No acute issues overnight. No fevers or chills. No vomiting, having some nausea  Abdominal pain improving      Vitals and Labs:  Vitals:    02/17/23 0125 02/17/23 0154 02/17/23 0412 02/17/23 0428   BP:    119/65   Pulse:   83 90   Resp: 16   15   Temp:    98.6 °F (37 °C)   TempSrc:    Oral   SpO2:    96%   Weight:  174 lb (78.9 kg)     Height:  5' 7\" (1.702 m)       No intake/output data recorded. Recent Labs     02/17/23  0539   WBC 12.2*   HGB 12.0   HCT 36.9   MCV 97.4        Recent Labs     02/17/23  0539   *   K 3.7   CL 98   CO2 20   PHOS 3.8   BUN 15   CREATININE 1.54*       No results for input(s): COLORU, PHUR, LABCAST, WBCUA, RBCUA, MUCUS, TRICHOMONAS, YEAST, BACTERIA, CLARITYU, SPECGRAV, LEUKOCYTESUR, UROBILINOGEN, Beth Cram in the last 72 hours. Invalid input(s): NITRATE, GLUCOSEUKETONESUAMORPHOUS    Physical Exam:  NAD  A/O x 3  RRR  No accessory muscles of inspiration  Abdomen soft, abdomen tender to palpation, moderately firm. No CVA tenderness. Manzano in place. Clear yellow UOP. EPCs on. Machine turned on.      Imaging:  CT cystogram shows extraperitoneal bladder rupture    Impression: 70 yo female with  Extraperitoneal bladder perforation after TURBT  Bladder cancer s/p TURBT  Patient Active Problem List   Diagnosis    Acute urinary retention    Small bowel obstruction (HCC)       Plan:  Regular diet  Maintain manzano catheter for 1 week  On IV antibiotics, will need to be discharged on oral antibiotics for 10 days  No plan for urological surgical intervention  No objection to discharge from urological standpoint  Follow up outpatient with Dr. Kojo Gregg MD  6:28 AM 2/17/2023

## 2023-02-17 NOTE — PROGRESS NOTES
Comprehensive Nutrition Assessment    Type and Reason for Visit:  Initial, Positive Nutrition Screen (Weight Loss; Poor Intakes/Appetite)    Nutrition Recommendations/Plan:   Continue current Clear Liquid diet. Encourage intakes as tolerated and monitor plans for diet advancement. Will provide clear liquid ONS x 2 per day. Monitor labs, weights, and plan of care. Malnutrition Assessment:  Malnutrition Status: At risk for malnutrition (02/17/23 1532)    Context:  Acute Illness     Findings of the 6 clinical characteristics of malnutrition:  Energy Intake:  75% or less of estimated energy requirements for 7 or more days  Weight Loss:  No significant weight loss     Body Fat Loss:  No significant body fat loss   Muscle Mass Loss:  No significant muscle mass loss   Fluid Accumulation:  No significant fluid accumulation   Strength:  Not Performed    Nutrition Assessment:    Admitted for bladder rupture s/p bladder biopsy with acute urinary retention with small bowel obstruction and free urine in peritoneal cavity. Pt admitted with c/o abdominal pain and nausea. Reports poor PO intakes recently. C/o constipation x past few days - noted previously reports having diarrhea. Weight fluctuations noted per chart review. Labs reviewed: Na 131 mmol/L. Meds reviewed. Nutrition Related Findings:    Labs/Meds reviewed. Hypoactive bowel sounds. Last BM 2/13 - c/o constipation. Wound Type: None       Current Nutrition Intake & Therapies:    Average Meal Intake:  (Diet recently started.)  Average Supplements Intake: None Ordered  ADULT DIET; Clear Liquid    Anthropometric Measures:  Height: 5' 7\" (170.2 cm)  Ideal Body Weight (IBW): 135 lbs (61 kg)    Admission Body Weight: 174 lb (78.9 kg)  Current Body Weight: 174 lb (78.9 kg), 128.9 % IBW.     Current BMI (kg/m2): 27.2  Usual Body Weight: 168 lb (76.2 kg)  % Weight Change (Calculated): 3.6                    BMI Categories: Overweight (BMI 25.0-29. 9)    Estimated Daily Nutrient Needs:  Energy Requirements Based On: Kcal/kg  Weight Used for Energy Requirements: Admission  Energy (kcal/day): 1020-3497 kcals/day  Weight Used for Protein Requirements: Ideal  Protein (g/day): 70-90 gm pro/day  Method Used for Fluid Requirements: ml/Kg  Fluid (ml/day): 25 mL/kg = 4507-3901 mL/day or per MD    Nutrition Diagnosis:   Inadequate oral intake related to altered GI function (recent surgery; SBO) as evidenced by NPO or clear liquid status due to medical condition, poor intake prior to admission    Nutrition Interventions:   Food and/or Nutrient Delivery: Continue Current Diet, Start Oral Nutrition Supplement (Provide clear liquid ONS. Monitor plans for diet advancement.)  Nutrition Education/Counseling: No recommendation at this time  Coordination of Nutrition Care: Continue to monitor while inpatient  Plan of Care discussed with: Patient    Goals:  Previous Goal Met:  (Goal Set)  Goals: Meet at least 75% of estimated needs, prior to discharge       Nutrition Monitoring and Evaluation:   Behavioral-Environmental Outcomes: None Identified  Food/Nutrient Intake Outcomes: Diet Advancement/Tolerance, Food and Nutrient Intake, Supplement Intake  Physical Signs/Symptoms Outcomes: Biochemical Data, GI Status, Constipation, Fluid Status or Edema, Weight, Skin, Nutrition Focused Physical Findings    Discharge Planning:     Too soon to determine     Virginia Fallon RD, WILLIS  Contact: [phone]

## 2023-02-17 NOTE — CARE COORDINATION
Case Management Assessment  Initial Evaluation    Date/Time of Evaluation: 2/17/2023 7:49 AM  Assessment Completed by: Adelbert Duane, RN    If patient is discharged prior to next notation, then this note serves as note for discharge by case management. Patient Name: Mariel Siegel                   YOB: 1945  Diagnosis: Postoperative or surgical complication, initial encounter [T81. 9XXA]  Postoperative or surgical complication, subsequent encounter [T81. 9XXD]                   Date / Time: 2/16/2023 11:50 PM    Patient Admission Status: Inpatient   Readmission Risk (Low < 19, Mod (19-27), High > 27): Readmission Risk Score: 6.6    Current PCP: Corrinne Morris, DO  PCP verified by CM? Yes    Chart Reviewed: Yes      History Provided by: Patient  Patient Orientation: Alert and Oriented    Patient Cognition: Alert    Hospitalization in the last 30 days (Readmission):  No    If yes, Readmission Assessment in CM Navigator will be completed. Advance Directives:      Code Status: Full Code   Patient's Primary Decision Maker is: Named in Scanned ACP Document      Discharge Planning:    Patient lives with: Alone Type of Home: Apartment  Primary Care Giver: Self  Patient Support Systems include: Family Members   Current Financial resources: Medicare  Current community resources: None  Current services prior to admission: None            Current DME:              Type of Home Care services:  None    ADLS  Prior functional level: Independent in ADLs/IADLs  Current functional level:      PT AM-PAC:   /24  OT AM-PAC:   /24    Family can provide assistance at DC:  (unsure)  Would you like Case Management to discuss the discharge plan with any other family members/significant others, and if so, who?  Yes (children and ex-spouse)  Plans to Return to Present Housing: Unknown at present  Other Identified Issues/Barriers to RETURNING to current housing: medical management  Potential Assistance needed at discharge: N/A            Potential DME:    Patient expects to discharge to: [facility] for transportation at discharge: Family    Financial    Payor: [address] / Plan: [address] / Product Type: *No Product type* /     Does insurance require precert for SNF: Yes    Potential assistance Purchasing Medications: No  Meds-to-Beds request:        [facility] #[id number] Joe Delgado [address] [phone] Dora Bhakta [phone] [address] [address]  Phone: [phone] Fax: [phone]      Notes:    Factors facilitating achievement of predicted outcomes: Motivated, Cooperative, Pleasant, and Sense of humor    Barriers to discharge: Medical complications    Additional Case Management Notes: Patient lives alone, independent PTA - has 2 children and an ex-spouse that are able to assist. No DME or [facility] services, drives. Following progress for needs. The Plan for Transition of Care is related to the following treatment goals of Postoperative or surgical complication, initial encounter [T81. 9XXA]  Postoperative or surgical complication, subsequent encounter [T81. 9XXD]    IF APPLICABLE: The Patient and/or patient representative Kelvin Lozano and her family were provided with a choice of provider and agrees with the discharge plan. Freedom of choice list with basic dialogue that supports the patient's individualized plan of care/goals and shares the quality data associated with the providers was provided to: Patient   Patient Representative Name:       The Patient and/or Patient Representative Agree with the Discharge Plan?  Yes    Casper Shaw RN  Case Management Department  Ph: [phone]

## 2023-02-17 NOTE — FLOWSHEET NOTE
Patient reports increasing pain. Primary notified. Pain meidcine increased. Will continue to monitor.

## 2023-02-18 PROBLEM — T81.61XA: Status: ACTIVE | Noted: 2023-02-18

## 2023-02-18 PROBLEM — N32.89 EXTRAPERITONEAL BLADDER PERFORATION: Status: ACTIVE | Noted: 2023-02-18

## 2023-02-18 LAB
ANION GAP SERPL CALCULATED.3IONS-SCNC: 12 MMOL/L (ref 9–17)
BUN SERPL-MCNC: 6 MG/DL (ref 8–23)
CALCIUM SERPL-MCNC: 8.4 MG/DL (ref 8.6–10.4)
CHLORIDE SERPL-SCNC: 99 MMOL/L (ref 98–107)
CO2 SERPL-SCNC: 24 MMOL/L (ref 20–31)
CREAT SERPL-MCNC: 0.66 MG/DL (ref 0.5–0.9)
GFR SERPL CREATININE-BSD FRML MDRD: >60 ML/MIN/1.73M2
GLUCOSE SERPL-MCNC: 113 MG/DL (ref 70–99)
HCT VFR BLD AUTO: 33.8 % (ref 36.3–47.1)
HGB BLD-MCNC: 11.3 G/DL (ref 11.9–15.1)
MAGNESIUM SERPL-MCNC: 1.9 MG/DL (ref 1.6–2.6)
MCH RBC QN AUTO: 31.9 PG (ref 25.2–33.5)
MCHC RBC AUTO-ENTMCNC: 33.4 G/DL (ref 28.4–34.8)
MCV RBC AUTO: 95.5 FL (ref 82.6–102.9)
NRBC AUTOMATED: 0 PER 100 WBC
PDW BLD-RTO: 12.9 % (ref 11.8–14.4)
PHOSPHATE SERPL-MCNC: 2.6 MG/DL (ref 2.6–4.5)
PLATELET # BLD AUTO: 258 K/UL (ref 138–453)
PMV BLD AUTO: 9.5 FL (ref 8.1–13.5)
POTASSIUM SERPL-SCNC: 3.5 MMOL/L (ref 3.7–5.3)
RBC # BLD: 3.54 M/UL (ref 3.95–5.11)
SODIUM SERPL-SCNC: 135 MMOL/L (ref 135–144)
WBC # BLD AUTO: 12.3 K/UL (ref 3.5–11.3)

## 2023-02-18 PROCEDURE — 80048 BASIC METABOLIC PNL TOTAL CA: CPT

## 2023-02-18 PROCEDURE — 2700000000 HC OXYGEN THERAPY PER DAY

## 2023-02-18 PROCEDURE — 6360000002 HC RX W HCPCS: Performed by: NURSE PRACTITIONER

## 2023-02-18 PROCEDURE — 2500000003 HC RX 250 WO HCPCS: Performed by: FAMILY MEDICINE

## 2023-02-18 PROCEDURE — 99232 SBSQ HOSP IP/OBS MODERATE 35: CPT | Performed by: FAMILY MEDICINE

## 2023-02-18 PROCEDURE — 6360000002 HC RX W HCPCS: Performed by: FAMILY MEDICINE

## 2023-02-18 PROCEDURE — 84100 ASSAY OF PHOSPHORUS: CPT

## 2023-02-18 PROCEDURE — 85027 COMPLETE CBC AUTOMATED: CPT

## 2023-02-18 PROCEDURE — 1200000000 HC SEMI PRIVATE

## 2023-02-18 PROCEDURE — 94761 N-INVAS EAR/PLS OXIMETRY MLT: CPT

## 2023-02-18 PROCEDURE — 6370000000 HC RX 637 (ALT 250 FOR IP): Performed by: FAMILY MEDICINE

## 2023-02-18 PROCEDURE — 36415 COLL VENOUS BLD VENIPUNCTURE: CPT

## 2023-02-18 PROCEDURE — 2580000003 HC RX 258: Performed by: NURSE PRACTITIONER

## 2023-02-18 PROCEDURE — 6370000000 HC RX 637 (ALT 250 FOR IP): Performed by: NURSE PRACTITIONER

## 2023-02-18 PROCEDURE — 6370000000 HC RX 637 (ALT 250 FOR IP): Performed by: INTERNAL MEDICINE

## 2023-02-18 PROCEDURE — 83735 ASSAY OF MAGNESIUM: CPT

## 2023-02-18 RX ORDER — CIPROFLOXACIN 500 MG/1
500 TABLET, FILM COATED ORAL EVERY 12 HOURS SCHEDULED
Qty: 20 TABLET | Refills: 0 | Status: SHIPPED | OUTPATIENT
Start: 2023-02-18 | End: 2023-02-28

## 2023-02-18 RX ORDER — CIPROFLOXACIN 500 MG/1
500 TABLET, FILM COATED ORAL EVERY 12 HOURS SCHEDULED
Status: DISCONTINUED | OUTPATIENT
Start: 2023-02-18 | End: 2023-02-19 | Stop reason: HOSPADM

## 2023-02-18 RX ORDER — POLYETHYLENE GLYCOL 3350 17 G/17G
17 POWDER, FOR SOLUTION ORAL DAILY
Status: DISCONTINUED | OUTPATIENT
Start: 2023-02-18 | End: 2023-02-19 | Stop reason: HOSPADM

## 2023-02-18 RX ORDER — HYDROCODONE BITARTRATE AND ACETAMINOPHEN 5; 325 MG/1; MG/1
1 TABLET ORAL EVERY 4 HOURS PRN
Qty: 18 TABLET | Refills: 0 | Status: SHIPPED | OUTPATIENT
Start: 2023-02-18 | End: 2023-02-21

## 2023-02-18 RX ORDER — DOCUSATE SODIUM 100 MG/1
100 CAPSULE, LIQUID FILLED ORAL 3 TIMES DAILY
Status: DISCONTINUED | OUTPATIENT
Start: 2023-02-18 | End: 2023-02-19 | Stop reason: HOSPADM

## 2023-02-18 RX ORDER — MAGNESIUM HYDROXIDE/ALUMINUM HYDROXICE/SIMETHICONE 120; 1200; 1200 MG/30ML; MG/30ML; MG/30ML
30 SUSPENSION ORAL EVERY 6 HOURS PRN
Status: DISCONTINUED | OUTPATIENT
Start: 2023-02-18 | End: 2023-02-19 | Stop reason: HOSPADM

## 2023-02-18 RX ORDER — METRONIDAZOLE 500 MG/1
500 TABLET ORAL EVERY 8 HOURS SCHEDULED
Status: DISCONTINUED | OUTPATIENT
Start: 2023-02-18 | End: 2023-02-19 | Stop reason: HOSPADM

## 2023-02-18 RX ORDER — LACTOBACILLUS RHAMNOSUS GG 10B CELL
1 CAPSULE ORAL DAILY
Status: DISCONTINUED | OUTPATIENT
Start: 2023-02-18 | End: 2023-02-19 | Stop reason: HOSPADM

## 2023-02-18 RX ORDER — ONDANSETRON 4 MG/1
4 TABLET, ORALLY DISINTEGRATING ORAL EVERY 8 HOURS PRN
Qty: 20 TABLET | Refills: 0 | Status: SHIPPED | OUTPATIENT
Start: 2023-02-18

## 2023-02-18 RX ORDER — PANTOPRAZOLE SODIUM 40 MG/1
40 TABLET, DELAYED RELEASE ORAL
Status: DISCONTINUED | OUTPATIENT
Start: 2023-02-19 | End: 2023-02-19 | Stop reason: HOSPADM

## 2023-02-18 RX ORDER — METRONIDAZOLE 500 MG/1
500 TABLET ORAL EVERY 8 HOURS SCHEDULED
Qty: 30 TABLET | Refills: 0 | Status: SHIPPED | OUTPATIENT
Start: 2023-02-18 | End: 2023-02-28

## 2023-02-18 RX ORDER — PSEUDOEPHEDRINE HCL 30 MG
100 TABLET ORAL 2 TIMES DAILY PRN
Qty: 20 CAPSULE | Refills: 0 | Status: SHIPPED | OUTPATIENT
Start: 2023-02-18 | End: 2023-02-28

## 2023-02-18 RX ADMIN — PIPERACILLIN AND TAZOBACTAM 3375 MG: 3; .375 INJECTION, POWDER, FOR SOLUTION INTRAVENOUS at 08:05

## 2023-02-18 RX ADMIN — Medication 1 CAPSULE: at 12:43

## 2023-02-18 RX ADMIN — TIMOLOL MALEATE 1 DROP: 2.5 SOLUTION OPHTHALMIC at 21:05

## 2023-02-18 RX ADMIN — DOCUSATE SODIUM 100 MG: 100 CAPSULE ORAL at 21:05

## 2023-02-18 RX ADMIN — PIPERACILLIN AND TAZOBACTAM 3375 MG: 3; .375 INJECTION, POWDER, FOR SOLUTION INTRAVENOUS at 00:13

## 2023-02-18 RX ADMIN — SODIUM CHLORIDE, PRESERVATIVE FREE 10 ML: 5 INJECTION INTRAVENOUS at 21:06

## 2023-02-18 RX ADMIN — HYDROCODONE BITARTRATE AND ACETAMINOPHEN 1 TABLET: 5; 325 TABLET ORAL at 21:06

## 2023-02-18 RX ADMIN — METRONIDAZOLE 500 MG: 500 TABLET, FILM COATED ORAL at 15:48

## 2023-02-18 RX ADMIN — HYDROCODONE BITARTRATE AND ACETAMINOPHEN 1 TABLET: 5; 325 TABLET ORAL at 16:55

## 2023-02-18 RX ADMIN — HYDROMORPHONE HYDROCHLORIDE 1 MG: 1 INJECTION, SOLUTION INTRAMUSCULAR; INTRAVENOUS; SUBCUTANEOUS at 00:06

## 2023-02-18 RX ADMIN — POLYETHYLENE GLYCOL 3350 17 G: 17 POWDER, FOR SOLUTION ORAL at 12:44

## 2023-02-18 RX ADMIN — HYDROCODONE BITARTRATE AND ACETAMINOPHEN 1 TABLET: 5; 325 TABLET ORAL at 12:12

## 2023-02-18 RX ADMIN — DOCUSATE SODIUM 100 MG: 100 CAPSULE ORAL at 15:48

## 2023-02-18 RX ADMIN — ANTI-FUNGAL POWDER MICONAZOLE NITRATE TALC FREE: 1.42 POWDER TOPICAL at 21:29

## 2023-02-18 RX ADMIN — HYDROMORPHONE HYDROCHLORIDE 0.5 MG: 1 INJECTION, SOLUTION INTRAMUSCULAR; INTRAVENOUS; SUBCUTANEOUS at 08:02

## 2023-02-18 RX ADMIN — SODIUM CHLORIDE, PRESERVATIVE FREE 10 ML: 5 INJECTION INTRAVENOUS at 08:03

## 2023-02-18 RX ADMIN — METRONIDAZOLE 500 MG: 500 TABLET, FILM COATED ORAL at 21:05

## 2023-02-18 RX ADMIN — ENOXAPARIN SODIUM 40 MG: 100 INJECTION SUBCUTANEOUS at 08:03

## 2023-02-18 RX ADMIN — CIPROFLOXACIN 500 MG: 500 TABLET, FILM COATED ORAL at 21:05

## 2023-02-18 RX ADMIN — TIMOLOL MALEATE 1 DROP: 2.5 SOLUTION OPHTHALMIC at 08:03

## 2023-02-18 RX ADMIN — ALUMINUM HYDROXIDE, MAGNESIUM HYDROXIDE, AND SIMETHICONE 30 ML: 200; 200; 20 SUSPENSION ORAL at 21:05

## 2023-02-18 ASSESSMENT — PAIN SCALES - GENERAL
PAINLEVEL_OUTOF10: 7
PAINLEVEL_OUTOF10: 6
PAINLEVEL_OUTOF10: 6
PAINLEVEL_OUTOF10: 7
PAINLEVEL_OUTOF10: 5
PAINLEVEL_OUTOF10: 7

## 2023-02-18 ASSESSMENT — PAIN DESCRIPTION - LOCATION
LOCATION: HEAD;ABDOMEN
LOCATION: ABDOMEN

## 2023-02-18 ASSESSMENT — ENCOUNTER SYMPTOMS
BLOOD IN STOOL: 0
ABDOMINAL DISTENTION: 1
SHORTNESS OF BREATH: 0
COUGH: 0
DIARRHEA: 0
CHEST TIGHTNESS: 0
VOMITING: 0
NAUSEA: 1
CONSTIPATION: 1
WHEEZING: 0
ABDOMINAL PAIN: 1
RHINORRHEA: 0

## 2023-02-18 ASSESSMENT — PAIN DESCRIPTION - DESCRIPTORS
DESCRIPTORS: ACHING
DESCRIPTORS: ACHING

## 2023-02-18 NOTE — PROGRESS NOTES
[facility]  Office: [address], DO, Aloma Blood, DO, Shahzad Girt, DO, Chester Mustafa Blood, DO, Echo Parr MD, Tino Mejia MD, Joseph Stevens MD, Dakota Lofton MD,  Juwan Hawley MD, Lexii Soriano MD, Debra Montes, DO, Miki Arshad MD,  Lalita Mccauley MD, Hayley Lindsey MD, Corey Cesar, DO, Angelina Gonsalez MD, Connie Ty MD, Shelly Roca DO, Moira Diop MD, Jigna Calderon MD, Virginia Duarte MD, Stan Kebede MD, Diane Bains DO, Colby Buckley MD, Kathy Richardson MD, Sia Berkowitz, CNP,  Diana Acevedo, CNP, Eulalia Colby, CNP, Mayra Torres, CNP,  Hannah Robertson, DNP, Beny Jean, CNP, Kathy Farnsworth, CNP, Champ Rachel, CNP, Piyush Levy, CNP, Leonarda Ocasio, CNP, James Bosch PA-C, Elise Desir, CNS, Viviana Reed, CNP, Gayla Hanks, CNP       [address] [place] [address]      Daily Progress Note     Admit Date: 2/16/2023  Bed/Room No.  2003/2003-01  Admitting Physician : Joseph Stevens MD  Code Status :[facility] Day:  LOS: 2 days   Chief Complaint:     No chief complaint on file. Principal Problem:    Intraperitoneal rupture of bladder  Active Problems:    Acute urinary retention    Small bowel obstruction (HCC)  Resolved Problems:    Postoperative or surgical complication, initial encounter    Postoperative or surgical complication, subsequent encounter    Subjective : Interval History/Significant events :  02/18/23    Patient reports improvement in her abdominal pain. Diet has been started. Patient is constipated, she is passing flatus. She is on IV antibiotics. Patient is breathing on room air. No plan for surgery by urology. Vitals - Stable afebrile  Labs -BUN 6, creatinine 0.66. Nursing notes , Consults notes reviewed. Overnight events and updates discussed with Nursing staff .    Background History:         Ramos Bernard is 69 y.o. female  Who was admitted to the hospital on 2/16/2023 for treatment of Intraperitoneal rupture of bladder. Patient presented to emergency room at [facility] with severe abdominal pain. She has history of bladder tumors and had cystoscopy as outpatient with her urologist.  Patient reported that she has been getting cystoscopy as a routine monitoring of her bladder tumors which had been low-grade bladder cancer. She is not on any chemotherapy currently. Patient had multiple abdominal surgeries before including for bowel obstructions, hysterectomy, oophorectomy for endometriosis, appendectomy. patient also has underlying history of glaucoma, hyperlipidemia. Evaluation at [facility] with CT abdomen showed mildly dilated loops and free fluid in pelvis around the bladder and presacral area concern for bladder leak and perforation. Patient also found to have elevated creatinine 2.62, leukocytosis. PMH:  Past Medical History:   Diagnosis Date    Acute urinary retention 2/17/2023    With subsequent post surgical rupture of bladder    Arrhythmia     Dr. Sol Nicholas  saw in 2015 for palpitations and feeling like I was going to pass out\". No longer follows w/ cardiology. No cardiac meds. Arthritis     back    Bursitis of left hip     Chronic back pain     lumbar    COVID-19     1/2022  mild, cold-like symptoms    Glaucoma     Hiatal hernia     Hyperlipidemia     Insomnia     Poor historian     Small bowel obstruction ([facility] [address] 2/17/2023    With severe constipation large bowel movement 5 days    TIA (transient ischemic attack)     Pt states,\"I  may have had a TIA many years ago. \" Never followed up and she is unsure. Under care of team     [address] pain clinic [facility] Dr. Dr. Almeida Lung  back pain    Under care of team     [facility]  GI    Vasovagal near syncope     Pt denies knowing of diagnosis. She saw Dr. Kimmie Wilburn 2015 as outpatient for \"arrhythmia\". No follow up needed per pt.  . States had testing done and no f/u    Vitamin D deficiency Wellness examination     Dr. Rodolfo Pettit, [place]  last seen 12/31/2022      Allergies:   Allergies   Allergen Reactions    Antihistamines, Diphenhydramine-Type Other (See Comments)     Bp drops and I pass out. If given in large amount    Biaxin [Clarithromycin] Hives     Abdominal pain    Ceclor [Cefaclor]      Hives and abdominal pain    Ceftin [Cefuroxime] Other (See Comments)     Hives and abdominal pain    Cleocin [Clindamycin] Other (See Comments)     Hives and abdominal pain    Erythromycin      Hives and abdominal pain    Naprelan [Naproxen] Hives    Prednisone Other (See Comments)     Hives and abdominal pain    Relafen [Nabumetone] Other (See Comments)     Hives and abdominal pain    Suprax [Cefixime] Other (See Comments)     Hives and abdominal pain    Zocor [Simvastatin] Other (See Comments)     Abdominal pain      Medications :  timolol, 1 drop, Both Eyes, BID  [Held by provider] esomeprazole, 40 mg, Oral, Daily  [Held by provider] estrogens (conjugated), 0.625 mg, Oral, Daily  sodium chloride flush, 5-40 mL, IntraVENous, 2 times per day  enoxaparin, 40 mg, SubCUTAneous, Daily  piperacillin-tazobactam, 3,375 mg, IntraVENous, Q8H        Review of Systems   Review of Systems   Constitutional:  Negative for appetite change, fatigue, fever and unexpected weight change.   HENT:  Negative for congestion, rhinorrhea and sneezing.    Eyes:  Negative for visual disturbance.   Respiratory:  Negative for cough, chest tightness, shortness of breath and wheezing.    Cardiovascular:  Negative for chest pain and palpitations.   Gastrointestinal:  Positive for abdominal distention, abdominal pain, constipation and nausea. Negative for blood in stool, diarrhea and vomiting.   Genitourinary:  Negative for dysuria, enuresis, frequency and hematuria.   Musculoskeletal:  Negative for arthralgias and myalgias.   Skin:  Negative for rash.   Neurological:  Negative for dizziness, weakness, light-headedness and headaches.  Hematological:  Does not bruise/bleed easily. Psychiatric/Behavioral:  Negative for dysphoric mood and sleep disturbance. Objective :      Current Vitals : Temp: 99 °F (37.2 °C),  Heart Rate: 79, Resp: 15, BP: 123/66, SpO2: 95 %  Last 24 Hrs Vitals   Patient Vitals for the past 24 hrs:   BP Temp Temp src Pulse Resp SpO2 Height   02/18/23 0411 123/66 99 °F (37.2 °C) Oral 79 15 95 % --   02/18/23 0036 -- -- -- -- 12 -- --   02/17/23 2336 -- 99 °F (37.2 °C) Oral -- -- -- --   02/17/23 2334 (!) 141/61 99 °F (37.2 °C) Oral 86 16 96 % --   02/17/23 2000 92/60 98.6 °F (37 °C) Oral 92 16 90 % --   02/17/23 1544 -- 98.1 °F (36.7 °C) Oral -- -- -- --   02/17/23 1509 -- -- -- -- -- -- 5' 7\" (1.702 m)   02/17/23 1207 -- -- Oral 77 -- 96 % --   02/17/23 1205 -- 98.6 °F (37 °C) Oral -- -- 97 % --     Intake / output   02/16 1901 - 02/18 0700  In: -   Out: 3650 [Urine:3650]  Physical Exam:  Physical Exam  Vitals and nursing note reviewed. Constitutional:       General: She is not in acute distress. Appearance: She is not diaphoretic. HENT:      Head: Normocephalic and atraumatic. Nose:      Right Sinus: No maxillary sinus tenderness or frontal sinus tenderness. Left Sinus: No maxillary sinus tenderness or frontal sinus tenderness. Mouth/Throat:      Pharynx: No oropharyngeal exudate. Eyes:      General: No scleral icterus. Conjunctiva/sclera: Conjunctivae normal.      Pupils: Pupils are equal, round, and reactive to light. Neck:      Thyroid: No thyromegaly. Vascular: No JVD. Cardiovascular:      Rate and Rhythm: Normal rate and regular rhythm. Pulses:           Dorsalis pedis pulses are 2+ on the right side and 2+ on the left side. Heart sounds: Normal heart sounds. No murmur heard. Pulmonary:      Effort: Pulmonary effort is normal.      Breath sounds: Normal breath sounds. No wheezing or rales. Abdominal:      Palpations: Abdomen is soft. There is no mass. Tenderness: There is abdominal tenderness in the suprapubic area. Musculoskeletal:      Cervical back: Full passive range of motion without pain and neck supple. Lymphadenopathy:      Head:      Right side of head: No submandibular adenopathy. Left side of head: No submandibular adenopathy. Cervical: No cervical adenopathy. Skin:     General: Skin is warm. Neurological:      Mental Status: She is alert and oriented to person, place, and time. Motor: No tremor. Psychiatric:         Behavior: Behavior is cooperative. Laboratory findings:    Recent Labs     02/17/23  0539 02/18/23  0436   WBC 12.2* 12.3*   HGB 12.0 11.3*   HCT 36.9 33.8*    258   INR 1.0  --      Recent Labs     02/17/23 0539 02/18/23  0436   *  --    K 3.7  --    CL 98  --    CO2 20  --    GLUCOSE 122*  --    BUN 15  --    CREATININE 1.54*  --    MG  --  1.9   CALCIUM 8.5*  --    PHOS 3.8 2.6     Recent Labs     02/17/23 0539   PROT 5.5*   LABALBU 3.2*   AST 61*   ALT 48*   ALKPHOS 90   BILITOT 0.9   LIPASE 15          No results found for: SPECGRAV, PROTEINU, RBCUA, BLOODU, BACTERIA, NITRU, WBCUA, LEUKOCYTESUR    Imaging / Clinical Data :-   CT CYSTOGRAM W CONTRAST    Result Date: 2/17/2023  Extraperitoneal bladder rupture. Clinical Course : gradually improving  Assessment and Plan  :        Extraperitoneal rupture of Bladder -change antibiotics to oral, oral pain medications. No plan for surgical intervention. Continue Beltre catheter. Outpatient follow-up with primary urologist Dr. Rob Sinha. Dyslipidemia -   Chronic Lumbar back pain   Advance diet  Discharge planning home, if tolerates diet and symptoms controlled. Continue to monitor vitals , Intake / output ,  Cell count , HGB , Kidney function, oxygenation  as indicated . Plan and updates discussed with patient and her  and family at bedside,  answers  explained to satisfaction.    Plan discussed with Staff  RN     (Please note that portions of this note were completed with a voice recognition program. Efforts were made to edit the dictations but occasionally words are mis-transcribed.)      Ni Woods MD  2/18/2023

## 2023-02-18 NOTE — PLAN OF CARE
Problem: Discharge Planning  Goal: Discharge to home or other facility with appropriate resources  Outcome: Progressing     Problem: Safety - Adult  Goal: Free from fall injury  Outcome: Progressing     Problem: Neurosensory - Adult  Goal: Achieves maximal functionality and self care  Outcome: Progressing     Problem: Cardiovascular - Adult  Goal: Maintains optimal cardiac output and hemodynamic stability  Outcome: Progressing     Problem: Skin/Tissue Integrity - Adult  Goal: Skin integrity remains intact  Outcome: Progressing  Goal: Incisions, wounds, or drain sites healing without S/S of infection  Outcome: Progressing     Problem: Gastrointestinal - Adult  Goal: Minimal or absence of nausea and vomiting  Outcome: Progressing  Goal: Maintains or returns to baseline bowel function  Outcome: Progressing  Goal: Maintains adequate nutritional intake  Outcome: Progressing     Problem: Genitourinary - Adult  Goal: Urinary catheter remains patent  Outcome: Progressing     Problem: Infection - Adult  Goal: Absence of infection during hospitalization  Outcome: Progressing     Problem: Metabolic/Fluid and Electrolytes - Adult  Goal: Hemodynamic stability and optimal renal function maintained  Outcome: Progressing     Problem: Hematologic - Adult  Goal: Maintains hematologic stability  Outcome: Progressing     Problem: Respiratory - Adult  Goal: Achieves optimal ventilation and oxygenation  Outcome: Progressing     Problem: Pain  Goal: Verbalizes/displays adequate comfort level or baseline comfort level  Outcome: Progressing     Problem: Nutrition Deficit:  Goal: Optimize nutritional status  Outcome: Progressing

## 2023-02-18 NOTE — PLAN OF CARE
Problem: Discharge Planning  Goal: Discharge to home or other facility with appropriate resources  2/18/2023 1600 by Summer Null RN  Outcome: Progressing  2/18/2023 0447 by Sadia Lugo RN  Outcome: Progressing     Problem: Safety - Adult  Goal: Free from fall injury  2/18/2023 1600 by Summer Null RN  Outcome: Progressing  2/18/2023 0447 by Sadia Lugo RN  Outcome: Progressing     Problem: Neurosensory - Adult  Goal: Achieves maximal functionality and self care  2/18/2023 1600 by Summer Null RN  Outcome: Progressing  2/18/2023 0447 by Sadia Lugo RN  Outcome: Progressing     Problem: Cardiovascular - Adult  Goal: Maintains optimal cardiac output and hemodynamic stability  2/18/2023 1600 by Sumemr Null RN  Outcome: Progressing  2/18/2023 0447 by Sadia Lugo RN  Outcome: Progressing     Problem: Skin/Tissue Integrity - Adult  Goal: Skin integrity remains intact  2/18/2023 1600 by Summer Null RN  Outcome: Progressing  2/18/2023 0447 by Sadia Lugo RN  Outcome: Progressing  Goal: Incisions, wounds, or drain sites healing without S/S of infection  2/18/2023 1600 by Summer Null RN  Outcome: Progressing  2/18/2023 0447 by Sadia Lugo RN  Outcome: Progressing     Problem: Gastrointestinal - Adult  Goal: Minimal or absence of nausea and vomiting  2/18/2023 1600 by Summer Null RN  Outcome: Progressing  2/18/2023 0447 by Sadia Lugo RN  Outcome: Progressing  Goal: Maintains or returns to baseline bowel function  2/18/2023 1600 by Summer Null RN  Outcome: Progressing  2/18/2023 0447 by Sadia Lugo RN  Outcome: Progressing  Goal: Maintains adequate nutritional intake  2/18/2023 1600 by Summer Null RN  Outcome: Progressing  2/18/2023 0447 by Sadia Lugo RN  Outcome: Progressing     Problem: Genitourinary - Adult  Goal: Urinary catheter remains patent  2/18/2023 1600 by Summer Null RN  Outcome: Progressing  2/18/2023 0447 by Sadia Lugo RN  Outcome: Progressing     Problem: Infection - Adult  Goal: Absence of infection during hospitalization  2/18/2023 1600 by Sara Florence RN  Outcome: Progressing  2/18/2023 0447 by Anneliese Guzman RN  Outcome: Progressing     Problem: Metabolic/Fluid and Electrolytes - Adult  Goal: Hemodynamic stability and optimal renal function maintained  2/18/2023 1600 by Sara Florence, RN  Outcome: Progressing  2/18/2023 0447 by Anneliese Guzman RN  Outcome: Progressing     Problem: Hematologic - Adult  Goal: Maintains hematologic stability  2/18/2023 1600 by Sara Florence, RN  Outcome: Progressing  2/18/2023 0447 by Anneliese Guzman RN  Outcome: Progressing     Problem: Respiratory - Adult  Goal: Achieves optimal ventilation and oxygenation  2/18/2023 1600 by Sara Florence RN  Outcome: Progressing  2/18/2023 0447 by Anneliese Guzman RN  Outcome: Progressing     Problem: Pain  Goal: Verbalizes/displays adequate comfort level or baseline comfort level  2/18/2023 1600 by Sara Florence RN  Outcome: Progressing  2/18/2023 0447 by Anneliese Guzman RN  Outcome: Progressing     Problem: Nutrition Deficit:  Goal: Optimize nutritional status  2/18/2023 1600 by Sara Florence RN  Outcome: Progressing  2/18/2023 0447 by Anneliese Guzman RN  Outcome: Progressing

## 2023-02-18 NOTE — PROGRESS NOTES
Urology Progress Note    Subjective: Jennifer Holcomb is a 69 y.o. female. His/Her current Diet is: Diet NPO Exceptions are: Sips of Water with Meds. Since the previous note, the patient reports the following:  Pain is improving  Still has lower abdominal discomfort  Urine output 2700 cc, clear      Vitals and Labs:  Vitals:    02/17/23 2334 02/17/23 2336 02/18/23 0036 02/18/23 0411   BP: (!) 141/61   123/66   Pulse: 86   79   Resp: 16  12 15   Temp: 99 °F (37.2 °C) 99 °F (37.2 °C)  99 °F (37.2 °C)   TempSrc: Oral Oral  Oral   SpO2: 96%   95%   Weight:       Height:         I/O last 3 completed shifts:  In: -   Out: 3650 [Urine:3650]    Recent Labs     02/17/23  0539 02/18/23  0436   WBC 12.2* 12.3*   HGB 12.0 11.3*   HCT 36.9 33.8*   MCV 97.4 95.5    258     Recent Labs     02/17/23  0539 02/18/23  0436   *  --    K 3.7  --    CL 98  --    CO2 20  --    PHOS 3.8 2.6   BUN 15  --    CREATININE 1.54*  --        No results for input(s): COLORU, PHUR, LABCAST, WBCUA, RBCUA, MUCUS, TRICHOMONAS, YEAST, BACTERIA, CLARITYU, SPECGRAV, LEUKOCYTESUR, UROBILINOGEN, BILIRUBINUR, BLOODU in the last 72 hours. Invalid input(s): NITRATE, GLUCOSEUKETONESUAMORPHOUS    Physical Exam:  NAD  A/O x 3  RRR  No accessory muscles of inspiration  Abdomen soft, lower abdominal tenderness to palpation   Manzano in place. Clear yellow UOP. EPCs on. Machine turned on. Imaging:  CT cystogram shows extraperitoneal bladder rupture    Impression: 68 yo female with  Extraperitoneal bladder perforation after TURBT  Bladder cancer s/p TURBT on 2/15/2023      Plan:  Pain improving, WBC stable. Will advance diet today. No plan for surgical intervention  Maintain manzano catheter for 1 week  On IV antibiotics, will need to be discharged on oral antibiotics for 10   We will reassess later today for possible discharge. She does have some ileus, however she is passing flatus.   Will await for return of bowel function    Neo Sandoval, MD  8:05 AM 2/18/2023    I have reviewed the history above and agree. I have repeated the key portions of the physical exam and concur with the resident's findings. I have reviewed all laboratory findings and imaging reports/films. I agree with the plan as noted above.     Electronically signed by Karla Clinton MD on 2/18/2023 at 9:31 AM

## 2023-02-18 NOTE — CARE COORDINATION
Discharge order placed in [facility]. CM spoke with patient and her  Prieto Deluna at bedside to discuss transitional planning. Patient was very surprised when CM discussed discharge and she states that she was just told by Dr. Stephanie Hyde that she would not be discharging today. PS sent to Dr. Stephanie Hyde questioning if patient will be discharging today. If patient is to discharge home today she will have transportation.     Discharge [facility] Case Management Department  Written by: Faith Calderon RN    Patient Name: Candice Fay  Attending Provider: Princess Edwin MD  Admit Date: 2023 11:50 PM  MRN: [id number]  Account: [de-identified]                     : 1945  Discharge Date: 23      Disposition: home    Faith Calderon RN

## 2023-02-18 NOTE — PROGRESS NOTES
[facility] [address]  PROGRESS NOTE    Shift date: 2023  Shift day: Saturday   Shift # 1    Room #    Name: Jacob Estevez                  Referral: Routine Visit    Admit Date & Time: 2023 11:50 PM    Assessment:  Jacob Estevez is a 69 y.o. female in the hospital. Upon entering the room writer observes the patient lying in bed, slightly leaned to the left side. The patient appears tired and calm. The patient shared that they transferred from [place] [address] due to a bladder injury that occurred during a prior surgery. The patient expressed frustration and sadness when explaining that her pain continued to worsen yet \"no one believed\" her, she was continually told that \"it'll eventually go away. \"     The patient named that in addition to their current hospitalization they are still awaiting the results of the biopsy from their bladder surgery and they experience colon issues that will be explored later in the month. The patient shared that their sister  last November of cancer. The patient appeared tearful and sad when exploring all that they are experiencing at this time. The patient expressed feeling supported by their [place] community and family. Intervention:  Writer introduced self and title as  Writer offered space for the patient  to express feelings, needs, and concerns and provided a ministry presence. Outcome: The patient expressed gratitude. Plan:  Chaplains will remain available to offer spiritual and emotional support as needed. 23 1201   Encounter Summary   Service Provided For: Patient   Referral/Consult From: [facility]   Support System Spouse; Children;Restoration/[place]ompson community   Last Encounter  23   Complexity of Encounter Low   Begin Time 1140   End Time  1145   Total Time Calculated 5 min   Assessment/Intervention/Outcome   Assessment Calm;Coping; Anxious; Tearful;Sad   Intervention Active listening;Explored/Affirmed feelings, thoughts, concerns;Sustaining Presence/Ministry of presence   Outcome Engaged in conversation;Expressed feelings, needs, and concerns;Expressed Gratitude     Electronically signed by Jesu Quintana, on 2/18/2023 at 12:03 PM.  [facility]  [phone]

## 2023-02-19 VITALS
WEIGHT: 169.09 LBS | TEMPERATURE: 98 F | HEART RATE: 89 BPM | RESPIRATION RATE: 15 BRPM | HEIGHT: 67 IN | SYSTOLIC BLOOD PRESSURE: 122 MMHG | DIASTOLIC BLOOD PRESSURE: 92 MMHG | OXYGEN SATURATION: 96 % | BODY MASS INDEX: 26.54 KG/M2

## 2023-02-19 LAB
HCT VFR BLD AUTO: 37.1 % (ref 36.3–47.1)
HGB BLD-MCNC: 11.9 G/DL (ref 11.9–15.1)
MAGNESIUM SERPL-MCNC: 2 MG/DL (ref 1.6–2.6)
MCH RBC QN AUTO: 31.2 PG (ref 25.2–33.5)
MCHC RBC AUTO-ENTMCNC: 32.1 G/DL (ref 28.4–34.8)
MCV RBC AUTO: 97.1 FL (ref 82.6–102.9)
NRBC AUTOMATED: 0 PER 100 WBC
PDW BLD-RTO: 12.7 % (ref 11.8–14.4)
PHOSPHATE SERPL-MCNC: 2.3 MG/DL (ref 2.6–4.5)
PLATELET # BLD AUTO: 308 K/UL (ref 138–453)
PMV BLD AUTO: 9.5 FL (ref 8.1–13.5)
RBC # BLD: 3.82 M/UL (ref 3.95–5.11)
WBC # BLD AUTO: 8.1 K/UL (ref 3.5–11.3)

## 2023-02-19 PROCEDURE — 6360000002 HC RX W HCPCS: Performed by: NURSE PRACTITIONER

## 2023-02-19 PROCEDURE — 84100 ASSAY OF PHOSPHORUS: CPT

## 2023-02-19 PROCEDURE — 6370000000 HC RX 637 (ALT 250 FOR IP): Performed by: INTERNAL MEDICINE

## 2023-02-19 PROCEDURE — 85027 COMPLETE CBC AUTOMATED: CPT

## 2023-02-19 PROCEDURE — 6370000000 HC RX 637 (ALT 250 FOR IP): Performed by: STUDENT IN AN ORGANIZED HEALTH CARE EDUCATION/TRAINING PROGRAM

## 2023-02-19 PROCEDURE — 36415 COLL VENOUS BLD VENIPUNCTURE: CPT

## 2023-02-19 PROCEDURE — 83735 ASSAY OF MAGNESIUM: CPT

## 2023-02-19 PROCEDURE — 6370000000 HC RX 637 (ALT 250 FOR IP): Performed by: FAMILY MEDICINE

## 2023-02-19 PROCEDURE — 2580000003 HC RX 258: Performed by: NURSE PRACTITIONER

## 2023-02-19 PROCEDURE — 99238 HOSP IP/OBS DSCHRG MGMT 30/<: CPT | Performed by: FAMILY MEDICINE

## 2023-02-19 RX ORDER — BISACODYL 10 MG
10 SUPPOSITORY, RECTAL RECTAL ONCE
Status: COMPLETED | OUTPATIENT
Start: 2023-02-19 | End: 2023-02-19

## 2023-02-19 RX ADMIN — HYDROCODONE BITARTRATE AND ACETAMINOPHEN 1 TABLET: 5; 325 TABLET ORAL at 10:36

## 2023-02-19 RX ADMIN — CIPROFLOXACIN 500 MG: 500 TABLET, FILM COATED ORAL at 08:29

## 2023-02-19 RX ADMIN — ENOXAPARIN SODIUM 40 MG: 100 INJECTION SUBCUTANEOUS at 08:29

## 2023-02-19 RX ADMIN — PANTOPRAZOLE SODIUM 40 MG: 40 TABLET, DELAYED RELEASE ORAL at 05:38

## 2023-02-19 RX ADMIN — DOCUSATE SODIUM 100 MG: 100 CAPSULE ORAL at 08:29

## 2023-02-19 RX ADMIN — Medication 1 CAPSULE: at 08:29

## 2023-02-19 RX ADMIN — TIMOLOL MALEATE 1 DROP: 2.5 SOLUTION OPHTHALMIC at 08:29

## 2023-02-19 RX ADMIN — POLYETHYLENE GLYCOL 3350 17 G: 17 POWDER, FOR SOLUTION ORAL at 08:29

## 2023-02-19 RX ADMIN — SODIUM CHLORIDE, PRESERVATIVE FREE 10 ML: 5 INJECTION INTRAVENOUS at 08:30

## 2023-02-19 RX ADMIN — ANTI-FUNGAL POWDER MICONAZOLE NITRATE TALC FREE: 1.42 POWDER TOPICAL at 08:30

## 2023-02-19 RX ADMIN — HYDROCODONE BITARTRATE AND ACETAMINOPHEN 1 TABLET: 5; 325 TABLET ORAL at 15:22

## 2023-02-19 RX ADMIN — BISACODYL 10 MG: 10 SUPPOSITORY RECTAL at 10:37

## 2023-02-19 RX ADMIN — HYDROCODONE BITARTRATE AND ACETAMINOPHEN 1 TABLET: 5; 325 TABLET ORAL at 05:52

## 2023-02-19 RX ADMIN — DOCUSATE SODIUM 100 MG: 100 CAPSULE ORAL at 12:43

## 2023-02-19 RX ADMIN — METRONIDAZOLE 500 MG: 500 TABLET, FILM COATED ORAL at 05:37

## 2023-02-19 RX ADMIN — HYDROCODONE BITARTRATE AND ACETAMINOPHEN 1 TABLET: 5; 325 TABLET ORAL at 01:18

## 2023-02-19 RX ADMIN — METRONIDAZOLE 500 MG: 500 TABLET, FILM COATED ORAL at 12:43

## 2023-02-19 ASSESSMENT — ENCOUNTER SYMPTOMS
RHINORRHEA: 0
COUGH: 0
ABDOMINAL PAIN: 1
CONSTIPATION: 1
SHORTNESS OF BREATH: 0
DIARRHEA: 0
BLOOD IN STOOL: 0
ABDOMINAL DISTENTION: 0
VOMITING: 0
WHEEZING: 0
NAUSEA: 0
CHEST TIGHTNESS: 0

## 2023-02-19 ASSESSMENT — PAIN SCALES - GENERAL
PAINLEVEL_OUTOF10: 4

## 2023-02-19 ASSESSMENT — PAIN DESCRIPTION - DESCRIPTORS: DESCRIPTORS: ACHING

## 2023-02-19 ASSESSMENT — PAIN DESCRIPTION - LOCATION
LOCATION: ABDOMEN
LOCATION: ABDOMEN

## 2023-02-19 NOTE — CARE COORDINATION
Transitional planning. Spoke to pt concerning dc. Pt and her  are concerned about dc with pain that pt is having. Per RN, physician is aware and dc order remains. Informed pt of her Medicare rights. They do not want to appeal.   Pt has home pain meds that physician is continuing.

## 2023-02-19 NOTE — DISCHARGE SUMMARY
[facility]  Office: [phone]  Monserrat Tylorradha Blood DO, Jahaira Wallis MD, Alan Maciel MD, Farzad Santos MD, Deny Donaldson MD, Jose Raul Medeiros MD, Carmelo Webb MD, Onel Harmon MD, Marleny Craig MD, Matilde Kate MD, Vannesa Tapia DO, Cheyenne Felipe MD, Cory Partida MD, Kathe Garcia DO, Dee Franco MD,  Dank Shaikh MD, Mehnaz Trevizo MD, Lukasz Johnson [facility], [facility], [place] Sill CNP, Rodri Carterin CNS, Estevan Moreau CNP, Robert Hilton CNP, Phoebe Feliz CNP, Jo Wilkerson CNP, Margo Haynes CNP, Nida Marquez PA-C, Luis Sawyer CNP, Yashira Vidal [facility], [facility], Tyrell Arzola CNP, Gisella Santiago [facility], [place] [id number]      Discharge Summary     Patient ID: Raulito Roger  :     MRN: [id number]     ACCOUNT:  [de-identified]   Patient Location :   Patient's PCP: Victoria Farmer DO  Admit Date: 2023   Discharge Date: 2023     Length of Stay: 3  Code Status:  Full Code  Admitting Physician: Farzad Santos MD  Discharge Physician: Farzad Santos MD     Active Discharge Diagnosis :     Primary Problem  Extraperitoneal bladder perforation      Hospital Problems  Active Hospital Problems    Diagnosis Date Noted    Extraperitoneal bladder perforation [N32.89] 2023     Priority: Medium    Chemical peritonitis following a procedure [T81.61XA] 2023     Priority: Medium    Acute urinary retention [R33.8] 2023    Small bowel obstruction ([address] [H71.299] 2023       Admission Condition:  fair     Discharged Condition: stable    Hospital Stay:     Hospital Course:  Raulito Roger is a 69 y.o. female who was admitted for the management of   Extraperitoneal bladder perforation , presented to ER with abdominal pain. Patient presented to emergency room at [facility] with severe abdominal pain.   She has history of bladder tumors and had cystoscopy as outpatient with her urologist.  Patient reported that she has been getting cystoscopy as a routine monitoring of her bladder tumors which had been low-grade bladder cancer. She is not on any chemotherapy currently. Patient had multiple abdominal surgeries before including for bowel obstructions, hysterectomy, oophorectomy for endometriosis, appendectomy. patient also has underlying history of glaucoma, hyperlipidemia. Evaluation at [facility] with CT abdomen showed mildly dilated loops and free fluid in pelvis around the bladder and presacral area concern for bladder leak and perforation. Patient also found to have elevated creatinine 2.62, leukocytosis. Significant therapeutic interventions:   Extraperitoneal rupture of Bladder following cystoscopy and tumor resection-  No plan for surgical intervention. Continue oral antibiotics, pain control Beltre catheter at discharge. Outpatient follow-up with primary urologist Dr. Akua Don.   Dyslipidemia -   Chronic Lumbar back pain    Significant Diagnostic Studies:   Labs / Micro:/Radiology  Recent Labs     02/19/23  0606 02/18/23  0436 02/17/23  0539   WBC 8.1 12.3* 12.2*   HGB 11.9 11.3* 12.0   HCT 37.1 33.8* 36.9   MCV 97.1 95.5 97.4    258 280     Labs Renal Latest Ref Rng & Units 2/18/2023 2/17/2023   BUN 8 - 23 mg/dL 6(L) 15   Cr 0.50 - 0.90 mg/dL 0.66 1.54(H)   K 3.7 - 5.3 mmol/L 3.5(L) 3.7   Na 135 - 144 mmol/L 135 131(L)     Lab Results   Component Value Date    ALT 48 (H) 02/17/2023    AST 61 (H) 02/17/2023    ALKPHOS 90 02/17/2023    BILITOT 0.9 02/17/2023     No results found for: TSHFT4, TSH  No results found for: HAV, HEPAIGM, HEPBIGM, HEPBCAB, HBEAG, HEPCAB  No results found for: VOL, APPEARANCE, COLORU, LABSPEC, LABPH, LEUKBLD, NITRU, GLUCOSEU, KETUA, UROBILINOGEN, KETUA, UROBILINOGEN, BILIRUBINUR, OCBU  No results found for: LABA1C  No results found for: EAG  Lab Results   Component Value Date INR 1.0 02/17/2023    PROTIME 10.4 02/17/2023       CT CYSTOGRAM W CONTRAST    Result Date: 2/17/2023  Extraperitoneal bladder rupture. Consultations:    Consults:     Final Specialist Recommendations/Findings:   IP CONSULT TO UROLOGY  IP CONSULT TO GENERAL SURGERY  IP CONSULT TO UROLOGY      The patient was seen and examined on day of discharge and this discharge summary is in conjunction with any daily progress note from day of discharge. Discharge plan:     Disposition: Home    Physician Follow Up:     Karley Arias MD  [phone], [address] [place]  [phone]    Follow up in 1 week(s)  hospital follow up       Requiring Further Evaluation/Follow Up POST HOSPITALIZATION/Incidental Findings: follow up with PCP. Diet: regular diet    Activity: As tolerated.     Instructions to Patient: follow up with Urology     Discharge Medications:      Medication List        START taking these medications      docusate 100 MG Caps  Commonly known as: COLACE, DULCOLAX  Take 100 mg by mouth 2 times daily as needed for Constipation     lactobacillus Tabs  Take 1 tablet by mouth 3 times daily (with meals) for 15 days     metroNIDAZOLE 500 MG tablet  Commonly known as: FLAGYL  Take 1 tablet by mouth every 8 hours for 30 doses     ondansetron 4 MG disintegrating tablet  Commonly known as: ZOFRAN-ODT  Take 1 tablet by mouth every 8 hours as needed for Nausea or Vomiting            CHANGE how you take these medications      ciprofloxacin 500 MG tablet  Commonly known as: CIPRO  Take 1 tablet by mouth every 12 hours for 20 doses  What changed: when to take this            CONTINUE taking these medications      Betoptic-S 0.25 % ophthalmic suspension  Generic drug: betaxolol     esomeprazole Magnesium 20 MG Pack  Commonly known as: NEXIUM     estrogens (conjugated) 0.625 MG tablet  Commonly known as: PREMARIN     * HYDROcodone-acetaminophen 5-325 MG per tablet  Commonly known as: NORCO     * HYDROcodone-acetaminophen 5-325 MG per tablet  Commonly known as: Norco  Take 1 tablet by mouth every 4 hours as needed for Pain for up to 3 days. Intended supply: 3 days. Take lowest dose possible to manage pain Max Daily Amount: 6 tablets     LORazepam 1 MG tablet  Commonly known as: ATIVAN     SUMAtriptan 20 MG/ACT nasal spray  Commonly known as: IMITREX           * This list has 2 medication(s) that are the same as other medications prescribed for you. Read the directions carefully, and ask your doctor or other care provider to review them with you. ASK your doctor about these medications      Latanoprostene Bunod 0.024 % Soln               Where to Get Your Medications        These medications were sent to [address] [place] [address] [phone] [facility] [phone]  [address]      Phone: [phone]   ciprofloxacin 500 MG tablet  docusate 100 MG Caps  HYDROcodone-acetaminophen 5-325 MG per tablet  lactobacillus Tabs  metroNIDAZOLE 500 MG tablet  ondansetron 4 MG disintegrating tablet         Time Spent on discharge is  25 mins in patient examination, evaluation, counseling as well as medication reconciliation, prescriptions for required medications, discharge plan and follow up. Electronically signed by   Marina Lizarraga MD  2/19/2023        Thank you Dr. Gabbie Lyles DO for the opportunity to be involved in this patient's care.

## 2023-02-19 NOTE — PROGRESS NOTES
[facility]  Office: [address], DO, Jacqueblanche Adam, DO, Nicholassarah Cheney, DO, Elizabeth Munoz Blood, DO, Juan Manuel Trujillo MD, Moon Osman MD, Shady Oconnor MD, Susan Hayes MD,  Tiffani Quintana MD, Keegan Martinez MD, Albina Atwood, DO, Judith Henderson MD,  Jose R Smiley MD, Owen Allen MD, Michael Harvey, DO, Blanca Fernando MD, Lorraine Munoz MD, Rajan Abel, DO, Elmer Davila MD, Rin Newell MD, Carlos Steven MD, Nely Albarran MD, Bushra May, DO, Allyson Guillroy MD, Geno Da Silva MD, Shine [place], [facility],  Jolie Jaeger, CNP, Jenny Caldwell, CNP, Brian Gaitan, CNP,  Franki Lundborg, [facility], Annette Gonsalves, CNP, Anoop Palm, CNP, Anusha Jasso, CNP, Maria Esther Cee, CNP, Analisa Buchanan, CNP, Danae Avery PALIANET, Milton Montes De Oca, CNS, Rich Tidwell, CNP, Sandy Mcdonald, [address] [facility]      Daily Progress Note     Admit Date: 2/16/2023  Bed/Room No.  2003/2003-01  Admitting Physician : Shady Oconnor MD  Code Status :[address] [facility] Day:  LOS: 3 days   Chief Complaint:     Abdominal pain following procedure (cystoscopy and tumor resection)     Principal Problem:    Extraperitoneal bladder perforation  Active Problems:    Chemical peritonitis following a procedure    Acute urinary retention    Small bowel obstruction [facility])  Resolved Problems:    Postoperative or surgical complication, initial encounter    Postoperative or surgical complication, subsequent encounter    Subjective : Interval History/Significant events :  02/19/23    Patient reported she is feeling better with abdominal pain. Patient has constipation. She is passing flatus. She denies nausea, vomiting. She was given suppositories and bowel regimen. Vitals - Stable afebrile  Labs -leukocytosis resolved. Nursing notes , Consults notes reviewed. Overnight events and updates discussed with Nursing staff . Background History:         Chelly Grier is 69 y.o. female  Who was admitted to the hospital on 2/16/2023 for treatment of Extraperitoneal bladder perforation. Patient presented to emergency room at [facility] with severe abdominal pain. She has history of bladder tumors and had cystoscopy as outpatient with her urologist.  Patient reported that she has been getting cystoscopy as a routine monitoring of her bladder tumors which had been low-grade bladder cancer. She is not on any chemotherapy currently. Patient had multiple abdominal surgeries before including for bowel obstructions, hysterectomy, oophorectomy for endometriosis, appendectomy. patient also has underlying history of glaucoma, hyperlipidemia. Evaluation at [facility] with CT abdomen showed mildly dilated loops and free fluid in pelvis around the bladder and presacral area concern for bladder leak and perforation. Patient also found to have elevated creatinine 2.62, leukocytosis. PMH:  Past Medical History:   Diagnosis Date    Acute urinary retention 2/17/2023    With subsequent post surgical rupture of bladder    Arrhythmia     Dr. Danny Herrera  saw in 2015 for palpitations and feeling like I was going to pass out\". No longer follows w/ cardiology. No cardiac meds. Arthritis     back    Bursitis of left hip     Chronic back pain     lumbar    COVID-19     1/2022  mild, cold-like symptoms    Glaucoma     Hiatal hernia     Hyperlipidemia     Insomnia     Poor historian     Small bowel obstruction ([facility] [address] 2/17/2023    With severe constipation large bowel movement 5 days    TIA (transient ischemic attack)     Pt states,\"I  may have had a TIA many years ago. \" Never followed up and she is unsure. Under care of team     [facility] [employer] [facility] Dr. Dr. Maria De Jesus Parra  back pain    Under care of team     [facility]  [facility]    Vasovagal near syncope     Pt denies knowing of diagnosis. She saw Dr. Kaiser Kilgore 2015 as outpatient for \"arrhythmia\". No follow up needed per pt. Marie Candelaria States had testing done and no f/u    Vitamin D deficiency     Wellness examination     Dr. Rodolfo Pettit, [place]  last seen 12/31/2022      Allergies:   Allergies   Allergen Reactions    Antihistamines, Diphenhydramine-Type Other (See Comments)     Bp drops and I pass out. If given in large amount    Biaxin [Clarithromycin] Hives     Abdominal pain    Ceclor [Cefaclor]      Hives and abdominal pain    Ceftin [Cefuroxime] Other (See Comments)     Hives and abdominal pain    Cleocin [Clindamycin] Other (See Comments)     Hives and abdominal pain    Erythromycin      Hives and abdominal pain    Naprelan [Naproxen] Hives    Prednisone Other (See Comments)     Hives and abdominal pain    Relafen [Nabumetone] Other (See Comments)     Hives and abdominal pain    Suprax [Cefixime] Other (See Comments)     Hives and abdominal pain    Zocor [Simvastatin] Other (See Comments)     Abdominal pain      Medications :  bisacodyl, 10 mg, Rectal, Once  docusate sodium, 100 mg, Oral, TID  pantoprazole, 40 mg, Oral, QAM AC  ciprofloxacin, 500 mg, Oral, 2 times per day  metroNIDAZOLE, 500 mg, Oral, 3 times per day  lactobacillus, 1 capsule, Oral, Daily  polyethylene glycol, 17 g, Oral, Daily  polyethylene glycol, 17 g, Oral, Daily  miconazole, , Topical, BID  timolol, 1 drop, Both Eyes, BID  [Held by provider] estrogens (conjugated), 0.625 mg, Oral, Daily  sodium chloride flush, 5-40 mL, IntraVENous, 2 times per day  enoxaparin, 40 mg, SubCUTAneous, Daily      Review of Systems   Review of Systems   Constitutional:  Negative for appetite change, fatigue, fever and unexpected weight change.   HENT:  Negative for congestion, rhinorrhea and sneezing.    Eyes:  Negative for visual disturbance.   Respiratory:  Negative for cough, chest tightness, shortness of breath and wheezing.    Cardiovascular:  Negative for chest pain and palpitations.   Gastrointestinal:  Positive for abdominal pain and constipation. Negative for abdominal distention,  blood in stool, diarrhea, nausea and vomiting. Genitourinary:  Negative for dysuria, enuresis, frequency and hematuria. Musculoskeletal:  Negative for arthralgias and myalgias. Skin:  Negative for rash. Neurological:  Negative for dizziness, weakness, light-headedness and headaches. Hematological:  Does not bruise/bleed easily. Psychiatric/Behavioral:  Negative for dysphoric mood and sleep disturbance. Objective :      Current Vitals : Temp: 98.3 °F (36.8 °C),  Heart Rate: 69, Resp: 16, BP: (!) 123/58, SpO2: 98 %  Last 24 Hrs Vitals   Patient Vitals for the past 24 hrs:   BP Temp Temp src Pulse Resp SpO2 Weight   02/19/23 0827 (!) 123/58 98.3 °F (36.8 °C) Oral 69 16 98 % --   02/19/23 0537 -- -- -- -- -- -- 169 lb 1.5 oz (76.7 kg)   02/19/23 0404 133/71 98.1 °F (36.7 °C) Oral 71 18 98 % --   02/19/23 0004 (!) 147/74 97.9 °F (36.6 °C) Oral 74 18 96 % --   02/18/23 1915 (!) 143/73 98.6 °F (37 °C) Oral 99 18 -- --   02/18/23 1545 (!) 149/76 98.7 °F (37.1 °C) Oral 79 16 96 % --   02/18/23 1115 (!) 145/69 98.4 °F (36.9 °C) Oral 78 16 -- --   02/18/23 1002 -- -- -- 80 -- 94 % --       Intake / output   02/17 1901 - 02/19 0700  In: 56 [P.O.:940]  Out: 4825 [Urine:4825]  Physical Exam:  Physical Exam  Vitals and nursing note reviewed. Constitutional:       General: She is not in acute distress. Appearance: She is not diaphoretic. HENT:      Head: Normocephalic and atraumatic. Nose:      Right Sinus: No maxillary sinus tenderness or frontal sinus tenderness. Left Sinus: No maxillary sinus tenderness or frontal sinus tenderness. Mouth/Throat:      Pharynx: No oropharyngeal exudate. Eyes:      General: No scleral icterus. Conjunctiva/sclera: Conjunctivae normal.      Pupils: Pupils are equal, round, and reactive to light. Neck:      Thyroid: No thyromegaly. Vascular: No JVD. Cardiovascular:      Rate and Rhythm: Normal rate and regular rhythm.       Pulses: Dorsalis pedis pulses are 2+ on the right side and 2+ on the left side. Heart sounds: Normal heart sounds. No murmur heard. Pulmonary:      Effort: Pulmonary effort is normal.      Breath sounds: Normal breath sounds. No wheezing or rales. Abdominal:      Palpations: Abdomen is soft. There is no mass. Tenderness: There is no abdominal tenderness. Musculoskeletal:      Cervical back: Full passive range of motion without pain and neck supple. Lymphadenopathy:      Head:      Right side of head: No submandibular adenopathy. Left side of head: No submandibular adenopathy. Cervical: No cervical adenopathy. Skin:     General: Skin is warm. Neurological:      Mental Status: She is alert and oriented to person, place, and time. Motor: No tremor. Psychiatric:         Behavior: Behavior is cooperative. Laboratory findings:    Recent Labs     02/17/23 0539 02/18/23 0436 02/19/23  0606   WBC 12.2* 12.3* 8.1   HGB 12.0 11.3* 11.9   HCT 36.9 33.8* 37.1    258 308   INR 1.0  --   --        Recent Labs     02/17/23 0539 02/18/23  0436 02/19/23  0606   * 135  --    K 3.7 3.5*  --    CL 98 99  --    CO2 20 24  --    GLUCOSE 122* 113*  --    BUN 15 6*  --    CREATININE 1.54* 0.66  --    MG  --  1.9 2.0   CALCIUM 8.5* 8.4*  --    PHOS 3.8 2.6 2.3*       Recent Labs     02/17/23 0539   PROT 5.5*   LABALBU 3.2*   AST 61*   ALT 48*   ALKPHOS 90   BILITOT 0.9   LIPASE 15            No results found for: SPECGRAV, PROTEINU, RBCUA, BLOODU, BACTERIA, NITRU, WBCUA, LEUKOCYTESUR    Imaging / Clinical Data :-   CT CYSTOGRAM W CONTRAST    Result Date: 2/17/2023  Extraperitoneal bladder rupture. Clinical Course : gradually improving  Assessment and Plan  :        Extraperitoneal rupture of Bladder following cystoscopy and tumor resection-  No plan for surgical intervention. Continue oral antibiotics, pain control Beltre catheter at discharge.   Outpatient follow-up with primary urologist Dr. Kelsie Evangelista. Dyslipidemia -   Chronic Lumbar back pain   Discharge home, discussed with urology team.  Follow-up with urology in 1 week. Call for appointment tomorrow on 2/20/2023    Continue to monitor vitals , Intake / output ,  Cell count , HGB , Kidney function, oxygenation  as indicated . Plan and updates discussed with patient and her  and family at bedside,  answers  explained to satisfaction.    Plan discussed with Staff Sly Perkins RN     (Please note that portions of this note were completed with a voice recognition program. Efforts were made to edit the dictations but occasionally words are mis-transcribed.)      Ni Woods MD  2/19/2023

## 2023-02-19 NOTE — PLAN OF CARE
Problem: Discharge Planning  Goal: Discharge to home or other facility with appropriate resources  2/18/2023 2205 by Ovidio Jaeger RN  Outcome: Progressing  2/18/2023 1600 by Romana Lowe, RN  Outcome: Progressing     Problem: Safety - Adult  Goal: Free from fall injury  2/18/2023 2205 by Ovidio Jaeger RN  Outcome: Progressing  2/18/2023 1600 by Romana Lowe, RN  Outcome: Progressing     Problem: Neurosensory - Adult  Goal: Achieves maximal functionality and self care  2/18/2023 2205 by Ovidio Jaeger RN  Outcome: Progressing  2/18/2023 1600 by Romana Lowe, RN  Outcome: Progressing     Problem: Cardiovascular - Adult  Goal: Maintains optimal cardiac output and hemodynamic stability  2/18/2023 2205 by Ovidio Jaeger RN  Outcome: Progressing  2/18/2023 1600 by Romana Lowe, RN  Outcome: Progressing     Problem: Skin/Tissue Integrity - Adult  Goal: Skin integrity remains intact  2/18/2023 2205 by Ovidio Jaeger RN  Outcome: Progressing  2/18/2023 1600 by Romana Lowe, RN  Outcome: Progressing  Goal: Incisions, wounds, or drain sites healing without S/S of infection  2/18/2023 2205 by Ovidio Jaeger RN  Outcome: Progressing  2/18/2023 1600 by Romana Lowe, RN  Outcome: Progressing     Problem: Gastrointestinal - Adult  Goal: Minimal or absence of nausea and vomiting  2/18/2023 2205 by Ovidio Jaeger RN  Outcome: Progressing  2/18/2023 1600 by Romana Lowe, RN  Outcome: Progressing  Goal: Maintains or returns to baseline bowel function  2/18/2023 2205 by Ovidio Jaeger RN  Outcome: Progressing  2/18/2023 1600 by Romana Lowe, RN  Outcome: Progressing  Goal: Maintains adequate nutritional intake  2/18/2023 2205 by Ovidio Jaeger RN  Outcome: Progressing  2/18/2023 1600 by Romana Lowe, RN  Outcome: Progressing     Problem: Genitourinary - Adult  Goal: Urinary catheter remains patent  2/18/2023 2205 by Ovidio Jaeger RN  Outcome: Progressing  2/18/2023 1600 by Bhupendra Munoz RN  Outcome: Progressing     Problem: Infection - Adult  Goal: Absence of infection during hospitalization  2/18/2023 2205 by Yamila Cisneros RN  Outcome: Progressing  2/18/2023 1600 by Bhupendra Munoz RN  Outcome: Progressing     Problem: Metabolic/Fluid and Electrolytes - Adult  Goal: Hemodynamic stability and optimal renal function maintained  2/18/2023 2205 by Yamila Cisneros RN  Outcome: Progressing  2/18/2023 1600 by Bhupendra Munoz RN  Outcome: Progressing     Problem: Hematologic - Adult  Goal: Maintains hematologic stability  2/18/2023 2205 by Yamila Cisneros RN  Outcome: Progressing  2/18/2023 1600 by Bhupendra Munoz RN  Outcome: Progressing     Problem: Respiratory - Adult  Goal: Achieves optimal ventilation and oxygenation  2/18/2023 2205 by Yamila Cisneros RN  Outcome: Progressing  2/18/2023 1600 by Bhupendra Munoz RN  Outcome: Progressing     Problem: Pain  Goal: Verbalizes/displays adequate comfort level or baseline comfort level  2/18/2023 2205 by Yamila Cisneros RN  Outcome: Progressing  2/18/2023 1600 by Bhupendra Munoz RN  Outcome: Progressing     Problem: Nutrition Deficit:  Goal: Optimize nutritional status  2/18/2023 2205 by Yamila Cisneros RN  Outcome: Progressing  2/18/2023 1600 by Bhupendra Munoz RN  Outcome: Progressing

## 2023-02-19 NOTE — PROGRESS NOTES
Urology Progress Note    Subjective: Stephanie Antoine is a 69 y.o. female. His/Her current Diet is: ADULT DIET; Regular. Since the previous note, the patient reports the following:  Pain improved  Urine output-3800 cc      Vitals and Labs:  Vitals:    02/18/23 1915 02/19/23 0004 02/19/23 0404 02/19/23 0537   BP: (!) 143/73 (!) 147/74 133/71    Pulse: 99 74 71    Resp: 18 18 18    Temp: 98.6 °F (37 °C) 97.9 °F (36.6 °C) 98.1 °F (36.7 °C)    TempSrc: Oral Oral Oral    SpO2:  96% 98%    Weight:    169 lb 1.5 oz (76.7 kg)   Height:         I/O last 3 completed shifts: In: 940 [P.O.:940]  Out: 4825 [Urine:4825]    Recent Labs     02/17/23  0539 02/18/23  0436 02/19/23  0606   WBC 12.2* 12.3* 8.1   HGB 12.0 11.3* 11.9   HCT 36.9 33.8* 37.1   MCV 97.4 95.5 97.1    258 308     Recent Labs     02/17/23  0539 02/18/23  0436 02/19/23  0606   * 135  --    K 3.7 3.5*  --    CL 98 99  --    CO2 20 24  --    PHOS 3.8 2.6 2.3*   BUN 15 6*  --    CREATININE 1.54* 0.66  --        No results for input(s): COLORU, PHUR, LABCAST, WBCUA, RBCUA, MUCUS, TRICHOMONAS, YEAST, BACTERIA, CLARITYU, SPECGRAV, LEUKOCYTESUR, UROBILINOGEN, BILIRUBINUR, BLOODU in the last 72 hours. Invalid input(s): NITRATE, GLUCOSEUKETONESUAMORPHOUS    Physical Exam:  NAD  A/O x 3  RRR  No accessory muscles of inspiration  Abdomen soft, nontender to palpation  Manzano in place. Clear yellow UOP. EPCs on. Machine turned on. Imaging:  CT cystogram shows extraperitoneal bladder rupture    Impression: 68 yo female with  Extraperitoneal bladder perforation after TURBT  Bladder cancer s/p TURBT on 2/15/2023      Plan:  Pain resolved/ WBC 8 from 12  No plan for surgical intervention  Maintain manzano catheter for 1 week  We will switch to oral antibiotics and continue for a week  Dulcolax suppository for helping with bowel movement  Okay for discharge today    Ramy Nuñez MD  7:48 AM 2/19/2023    I have reviewed the history above and agree.   I have repeated the key portions of the physical exam and concur with the resident's findings. I have reviewed all laboratory findings and imaging reports/films. I agree with the plan as noted above.     Electronically signed by Hakan Orozco MD on 2/19/2023 at 10:40 AM

## 2023-02-24 LAB — IBD PANEL: NORMAL

## 2025-01-10 ENCOUNTER — HOSPITAL ENCOUNTER (INPATIENT)
Age: 80
LOS: 6 days | Discharge: NURSING, REHAB OR LONG-TERM CARE | End: 2025-01-16
Attending: EMERGENCY MEDICINE | Admitting: INTERNAL MEDICINE
Payer: COMMERCIAL

## 2025-01-10 ENCOUNTER — APPOINTMENT (OUTPATIENT)
Dept: GENERAL RADIOLOGY | Age: 80
End: 2025-01-10
Payer: COMMERCIAL

## 2025-01-10 DIAGNOSIS — S82.892A CLOSED FRACTURE OF LEFT ANKLE, INITIAL ENCOUNTER: Primary | ICD-10-CM

## 2025-01-10 DIAGNOSIS — G89.18 POST-OP PAIN: ICD-10-CM

## 2025-01-10 PROBLEM — S82.852A TRIMALLEOLAR FRACTURE OF ANKLE, CLOSED, LEFT, INITIAL ENCOUNTER: Status: ACTIVE | Noted: 2025-01-10

## 2025-01-10 PROCEDURE — 99285 EMERGENCY DEPT VISIT HI MDM: CPT

## 2025-01-10 PROCEDURE — 99222 1ST HOSP IP/OBS MODERATE 55: CPT | Performed by: INTERNAL MEDICINE

## 2025-01-10 PROCEDURE — 73600 X-RAY EXAM OF ANKLE: CPT

## 2025-01-10 PROCEDURE — 6370000000 HC RX 637 (ALT 250 FOR IP): Performed by: EMERGENCY MEDICINE

## 2025-01-10 PROCEDURE — 73610 X-RAY EXAM OF ANKLE: CPT

## 2025-01-10 PROCEDURE — 6360000002 HC RX W HCPCS

## 2025-01-10 PROCEDURE — 1200000000 HC SEMI PRIVATE

## 2025-01-10 RX ORDER — HYDROCODONE BITARTRATE AND ACETAMINOPHEN 5; 325 MG/1; MG/1
1 TABLET ORAL ONCE
Status: COMPLETED | OUTPATIENT
Start: 2025-01-10 | End: 2025-01-10

## 2025-01-10 RX ORDER — LIDOCAINE HYDROCHLORIDE 10 MG/ML
INJECTION, SOLUTION EPIDURAL; INFILTRATION; INTRACAUDAL; PERINEURAL
Status: COMPLETED
Start: 2025-01-10 | End: 2025-01-10

## 2025-01-10 RX ORDER — LIDOCAINE HYDROCHLORIDE 10 MG/ML
5 INJECTION, SOLUTION INFILTRATION; PERINEURAL ONCE
Status: COMPLETED | OUTPATIENT
Start: 2025-01-10 | End: 2025-01-10

## 2025-01-10 RX ADMIN — HYDROCODONE BITARTRATE AND ACETAMINOPHEN 1 TABLET: 5; 325 TABLET ORAL at 20:05

## 2025-01-10 RX ADMIN — LIDOCAINE HYDROCHLORIDE 5 ML: 10 INJECTION, SOLUTION EPIDURAL; INFILTRATION; INTRACAUDAL; PERINEURAL at 18:33

## 2025-01-10 RX ADMIN — LIDOCAINE HYDROCHLORIDE 5 ML: 10 INJECTION, SOLUTION INFILTRATION; PERINEURAL at 18:33

## 2025-01-10 ASSESSMENT — PAIN SCALES - GENERAL: PAINLEVEL_OUTOF10: 10

## 2025-01-10 NOTE — ED NOTES
Pt arrived via ems after tripping and falling on ice causing a left ankle fracture. On scene, pt was given 80 mg of ketamine IM. Pt arrived to er, lethargic and unable to answer questions. Dr. Lozano at [facility], [facility] was moved back into place. Pt is currently still sedated from the IM ketamine. RN at bedside monitoring pt until stable.

## 2025-01-11 ENCOUNTER — APPOINTMENT (OUTPATIENT)
Dept: GENERAL RADIOLOGY | Age: 80
End: 2025-01-11
Payer: COMMERCIAL

## 2025-01-11 ENCOUNTER — ANESTHESIA EVENT (OUTPATIENT)
Dept: OPERATING ROOM | Age: 80
End: 2025-01-11
Payer: COMMERCIAL

## 2025-01-11 PROBLEM — E78.5 DYSLIPIDEMIA: Status: ACTIVE | Noted: 2017-04-04

## 2025-01-11 PROBLEM — C67.9 BLADDER CANCER (HCC): Chronic | Status: ACTIVE | Noted: 2025-01-11

## 2025-01-11 PROBLEM — M51.369 DEGENERATION OF INTERVERTEBRAL DISC OF LUMBAR REGION: Status: ACTIVE | Noted: 2017-04-04

## 2025-01-11 LAB
ANION GAP SERPL CALCULATED.3IONS-SCNC: 14 MMOL/L (ref 9–17)
BASOPHILS # BLD: 0.1 K/UL (ref 0–0.2)
BASOPHILS NFR BLD: 1 % (ref 0–2)
BUN SERPL-MCNC: 10 MG/DL (ref 8–23)
CALCIUM SERPL-MCNC: 9.1 MG/DL (ref 8.6–10.4)
CHLORIDE SERPL-SCNC: 100 MMOL/L (ref 98–107)
CO2 SERPL-SCNC: 23 MMOL/L (ref 20–31)
CREAT SERPL-MCNC: 0.6 MG/DL (ref 0.5–0.9)
EOSINOPHIL # BLD: 0.3 K/UL (ref 0–0.4)
EOSINOPHILS RELATIVE PERCENT: 4 % (ref 1–4)
ERYTHROCYTE [DISTWIDTH] IN BLOOD BY AUTOMATED COUNT: 13.1 % (ref 12.5–15.4)
GFR, ESTIMATED: >90 ML/MIN/1.73M2
GLUCOSE SERPL-MCNC: 111 MG/DL (ref 70–99)
HCT VFR BLD AUTO: 38.2 % (ref 36–46)
HGB BLD-MCNC: 13 G/DL (ref 12–16)
LYMPHOCYTES NFR BLD: 1.8 K/UL (ref 1–4.8)
LYMPHOCYTES RELATIVE PERCENT: 20 % (ref 24–44)
MCH RBC QN AUTO: 31.7 PG (ref 26–34)
MCHC RBC AUTO-ENTMCNC: 34 G/DL (ref 31–37)
MCV RBC AUTO: 93.3 FL (ref 80–100)
MONOCYTES NFR BLD: 0.7 K/UL (ref 0.1–1.2)
MONOCYTES NFR BLD: 8 % (ref 2–11)
NEUTROPHILS NFR BLD: 67 % (ref 36–66)
NEUTS SEG NFR BLD: 6.1 K/UL (ref 1.8–7.7)
PLATELET # BLD AUTO: 287 K/UL (ref 140–450)
PMV BLD AUTO: 8 FL (ref 6–12)
POTASSIUM SERPL-SCNC: 4 MMOL/L (ref 3.7–5.3)
RBC # BLD AUTO: 4.09 M/UL (ref 4–5.2)
SODIUM SERPL-SCNC: 137 MMOL/L (ref 135–144)
WBC OTHER # BLD: 9 K/UL (ref 3.5–11)

## 2025-01-11 PROCEDURE — 97535 SELF CARE MNGMENT TRAINING: CPT

## 2025-01-11 PROCEDURE — 97162 PT EVAL MOD COMPLEX 30 MIN: CPT

## 2025-01-11 PROCEDURE — 2500000003 HC RX 250 WO HCPCS: Performed by: INTERNAL MEDICINE

## 2025-01-11 PROCEDURE — 36415 COLL VENOUS BLD VENIPUNCTURE: CPT

## 2025-01-11 PROCEDURE — 85025 COMPLETE CBC W/AUTO DIFF WBC: CPT

## 2025-01-11 PROCEDURE — 6370000000 HC RX 637 (ALT 250 FOR IP): Performed by: STUDENT IN AN ORGANIZED HEALTH CARE EDUCATION/TRAINING PROGRAM

## 2025-01-11 PROCEDURE — 99232 SBSQ HOSP IP/OBS MODERATE 35: CPT | Performed by: STUDENT IN AN ORGANIZED HEALTH CARE EDUCATION/TRAINING PROGRAM

## 2025-01-11 PROCEDURE — 73502 X-RAY EXAM HIP UNI 2-3 VIEWS: CPT

## 2025-01-11 PROCEDURE — 6370000000 HC RX 637 (ALT 250 FOR IP): Performed by: INTERNAL MEDICINE

## 2025-01-11 PROCEDURE — 97166 OT EVAL MOD COMPLEX 45 MIN: CPT

## 2025-01-11 PROCEDURE — 6360000002 HC RX W HCPCS: Performed by: INTERNAL MEDICINE

## 2025-01-11 PROCEDURE — 80048 BASIC METABOLIC PNL TOTAL CA: CPT

## 2025-01-11 PROCEDURE — 1200000000 HC SEMI PRIVATE

## 2025-01-11 PROCEDURE — 97116 GAIT TRAINING THERAPY: CPT

## 2025-01-11 RX ORDER — SODIUM CHLORIDE 0.9 % (FLUSH) 0.9 %
5-40 SYRINGE (ML) INJECTION PRN
Status: DISCONTINUED | OUTPATIENT
Start: 2025-01-11 | End: 2025-01-16 | Stop reason: HOSPADM

## 2025-01-11 RX ORDER — SENNA AND DOCUSATE SODIUM 50; 8.6 MG/1; MG/1
2 TABLET, FILM COATED ORAL DAILY PRN
Status: DISCONTINUED | OUTPATIENT
Start: 2025-01-11 | End: 2025-01-16 | Stop reason: HOSPADM

## 2025-01-11 RX ORDER — METOPROLOL TARTRATE 1 MG/ML
5 INJECTION, SOLUTION INTRAVENOUS EVERY 4 HOURS PRN
Status: DISCONTINUED | OUTPATIENT
Start: 2025-01-11 | End: 2025-01-16 | Stop reason: HOSPADM

## 2025-01-11 RX ORDER — POTASSIUM CHLORIDE 1500 MG/1
40 TABLET, EXTENDED RELEASE ORAL PRN
Status: DISCONTINUED | OUTPATIENT
Start: 2025-01-11 | End: 2025-01-16 | Stop reason: HOSPADM

## 2025-01-11 RX ORDER — HYDROCODONE BITARTRATE AND ACETAMINOPHEN 5; 325 MG/1; MG/1
1 TABLET ORAL EVERY 4 HOURS PRN
Status: DISCONTINUED | OUTPATIENT
Start: 2025-01-11 | End: 2025-01-16 | Stop reason: HOSPADM

## 2025-01-11 RX ORDER — PANTOPRAZOLE SODIUM 40 MG/1
40 TABLET, DELAYED RELEASE ORAL
Status: DISCONTINUED | OUTPATIENT
Start: 2025-01-11 | End: 2025-01-14

## 2025-01-11 RX ORDER — ENOXAPARIN SODIUM 100 MG/ML
40 INJECTION SUBCUTANEOUS DAILY
Status: DISCONTINUED | OUTPATIENT
Start: 2025-01-11 | End: 2025-01-16 | Stop reason: HOSPADM

## 2025-01-11 RX ORDER — TIMOLOL MALEATE 2.5 MG/ML
1 SOLUTION/ DROPS OPHTHALMIC 2 TIMES DAILY
Status: DISCONTINUED | OUTPATIENT
Start: 2025-01-11 | End: 2025-01-16 | Stop reason: HOSPADM

## 2025-01-11 RX ORDER — ONDANSETRON 4 MG/1
4 TABLET, ORALLY DISINTEGRATING ORAL EVERY 8 HOURS PRN
Status: DISCONTINUED | OUTPATIENT
Start: 2025-01-11 | End: 2025-01-16 | Stop reason: HOSPADM

## 2025-01-11 RX ORDER — LORAZEPAM 1 MG/1
1 TABLET ORAL DAILY PRN
Status: DISCONTINUED | OUTPATIENT
Start: 2025-01-11 | End: 2025-01-16 | Stop reason: HOSPADM

## 2025-01-11 RX ORDER — SODIUM CHLORIDE 0.9 % (FLUSH) 0.9 %
5-40 SYRINGE (ML) INJECTION EVERY 12 HOURS SCHEDULED
Status: DISCONTINUED | OUTPATIENT
Start: 2025-01-11 | End: 2025-01-16 | Stop reason: HOSPADM

## 2025-01-11 RX ORDER — SODIUM CHLORIDE 9 MG/ML
INJECTION, SOLUTION INTRAVENOUS PRN
Status: DISCONTINUED | OUTPATIENT
Start: 2025-01-11 | End: 2025-01-16 | Stop reason: HOSPADM

## 2025-01-11 RX ORDER — ONDANSETRON 2 MG/ML
4 INJECTION INTRAMUSCULAR; INTRAVENOUS EVERY 6 HOURS PRN
Status: DISCONTINUED | OUTPATIENT
Start: 2025-01-11 | End: 2025-01-16 | Stop reason: HOSPADM

## 2025-01-11 RX ORDER — POLYETHYLENE GLYCOL 3350 17 G/17G
17 POWDER, FOR SOLUTION ORAL DAILY PRN
Status: DISCONTINUED | OUTPATIENT
Start: 2025-01-11 | End: 2025-01-14

## 2025-01-11 RX ORDER — POTASSIUM CHLORIDE 7.45 MG/ML
10 INJECTION INTRAVENOUS PRN
Status: DISCONTINUED | OUTPATIENT
Start: 2025-01-11 | End: 2025-01-16 | Stop reason: HOSPADM

## 2025-01-11 RX ORDER — ACETAMINOPHEN 650 MG/1
650 SUPPOSITORY RECTAL EVERY 6 HOURS PRN
Status: DISCONTINUED | OUTPATIENT
Start: 2025-01-11 | End: 2025-01-16 | Stop reason: HOSPADM

## 2025-01-11 RX ORDER — ESOMEPRAZOLE MAGNESIUM 20 MG/1
40 GRANULE, DELAYED RELEASE ORAL DAILY
Status: DISCONTINUED | OUTPATIENT
Start: 2025-01-11 | End: 2025-01-11 | Stop reason: CLARIF

## 2025-01-11 RX ORDER — HYDRALAZINE HYDROCHLORIDE 20 MG/ML
10 INJECTION INTRAMUSCULAR; INTRAVENOUS EVERY 6 HOURS PRN
Status: DISCONTINUED | OUTPATIENT
Start: 2025-01-11 | End: 2025-01-16 | Stop reason: HOSPADM

## 2025-01-11 RX ORDER — ACETAMINOPHEN 325 MG/1
650 TABLET ORAL EVERY 6 HOURS PRN
Status: DISCONTINUED | OUTPATIENT
Start: 2025-01-11 | End: 2025-01-16 | Stop reason: HOSPADM

## 2025-01-11 RX ORDER — HYDROCODONE BITARTRATE AND ACETAMINOPHEN 5; 325 MG/1; MG/1
2 TABLET ORAL EVERY 4 HOURS PRN
Status: DISCONTINUED | OUTPATIENT
Start: 2025-01-11 | End: 2025-01-16 | Stop reason: HOSPADM

## 2025-01-11 RX ORDER — BISACODYL 10 MG
10 SUPPOSITORY, RECTAL RECTAL DAILY PRN
Status: DISCONTINUED | OUTPATIENT
Start: 2025-01-11 | End: 2025-01-16 | Stop reason: HOSPADM

## 2025-01-11 RX ORDER — SUMATRIPTAN 20 MG/1
20 SPRAY NASAL
Status: ACTIVE | OUTPATIENT
Start: 2025-01-11 | End: 2025-01-12

## 2025-01-11 RX ADMIN — HYDROCODONE BITARTRATE AND ACETAMINOPHEN 2 TABLET: 5; 325 TABLET ORAL at 21:46

## 2025-01-11 RX ADMIN — TIMOLOL MALEATE 1 DROP: 2.5 SOLUTION/ DROPS OPHTHALMIC at 12:11

## 2025-01-11 RX ADMIN — PANTOPRAZOLE SODIUM 40 MG: 40 TABLET, DELAYED RELEASE ORAL at 05:48

## 2025-01-11 RX ADMIN — SODIUM CHLORIDE, PRESERVATIVE FREE 10 ML: 5 INJECTION INTRAVENOUS at 10:46

## 2025-01-11 RX ADMIN — ENOXAPARIN SODIUM 40 MG: 100 INJECTION SUBCUTANEOUS at 10:46

## 2025-01-11 RX ADMIN — HYDROCODONE BITARTRATE AND ACETAMINOPHEN 2 TABLET: 5; 325 TABLET ORAL at 16:12

## 2025-01-11 RX ADMIN — HYDROCODONE BITARTRATE AND ACETAMINOPHEN 2 TABLET: 5; 325 TABLET ORAL at 10:45

## 2025-01-11 RX ADMIN — HYDROMORPHONE HYDROCHLORIDE 0.5 MG: 1 INJECTION, SOLUTION INTRAMUSCULAR; INTRAVENOUS; SUBCUTANEOUS at 00:57

## 2025-01-11 RX ADMIN — HYDROCODONE BITARTRATE AND ACETAMINOPHEN 2 TABLET: 5; 325 TABLET ORAL at 05:47

## 2025-01-11 RX ADMIN — LORAZEPAM 1 MG: 1 TABLET ORAL at 23:00

## 2025-01-11 RX ADMIN — SENNOSIDES AND DOCUSATE SODIUM 2 TABLET: 50; 8.6 TABLET ORAL at 10:46

## 2025-01-11 RX ADMIN — SODIUM CHLORIDE, PRESERVATIVE FREE 10 ML: 5 INJECTION INTRAVENOUS at 21:32

## 2025-01-11 ASSESSMENT — PAIN SCALES - GENERAL
PAINLEVEL_OUTOF10: 4
PAINLEVEL_OUTOF10: 8
PAINLEVEL_OUTOF10: 6
PAINLEVEL_OUTOF10: 8
PAINLEVEL_OUTOF10: 7
PAINLEVEL_OUTOF10: 4
PAINLEVEL_OUTOF10: 10

## 2025-01-11 ASSESSMENT — PAIN DESCRIPTION - DESCRIPTORS
DESCRIPTORS: THROBBING
DESCRIPTORS: ACHING;DISCOMFORT
DESCRIPTORS: ACHING;THROBBING
DESCRIPTORS: STABBING;ACHING

## 2025-01-11 ASSESSMENT — PAIN DESCRIPTION - ORIENTATION
ORIENTATION: LEFT

## 2025-01-11 ASSESSMENT — PAIN DESCRIPTION - LOCATION
LOCATION: ANKLE
LOCATION: ANKLE;HIP
LOCATION: BACK;HEAD;LEG
LOCATION: ANKLE
LOCATION: ANKLE

## 2025-01-11 ASSESSMENT — PAIN - FUNCTIONAL ASSESSMENT
PAIN_FUNCTIONAL_ASSESSMENT: ACTIVITIES ARE NOT PREVENTED
PAIN_FUNCTIONAL_ASSESSMENT: PREVENTS OR INTERFERES SOME ACTIVE ACTIVITIES AND ADLS

## 2025-01-11 NOTE — PROGRESS NOTES
Occupational Therapy  Occupational Therapy Initial Evaluation  Facility/Department: [address]   Patient Name: Bibiana Garces        MRN: [id number]    : 1945    Date of Service: 2025    Chief Complaint   Patient presents with    Ankle Pain     Past Medical History:  has a past medical history of Acute urinary retention, Arrhythmia, Arthritis, Bursitis of left hip, Chronic back pain, COVID-19, Glaucoma, Hiatal hernia, Hyperlipidemia, Insomnia, Poor historian, Small bowel obstruction (HCC), TIA (transient ischemic attack), Under care of team, Under care of team, Vasovagal near syncope, Vitamin D deficiency, and Wellness examination.  Past Surgical History:  has a past surgical history that includes Hysterectomy (); Small intestine surgery; Cholecystectomy; Appendectomy; Tonsillectomy; eye surgery; Foot surgery (Right); Colonoscopy; Endoscopy, colon, diagnostic; Knee cartilage surgery (Right); Cystoscopy (N/A, 02/15/2023); and Cystoscopy (N/A, 2/15/2023).    Discharge Recommendations  Discharge Recommendations: Patient would benefit from continued therapy after discharge  OT Equipment Recommendations  Other: continue to assess    Assessment  Performance deficits / Impairments: Decreased functional mobility ;Decreased ADL status;Decreased balance;Decreased safe awareness  Assessment: Patient demonstrated decreased ADLs, balance and functional mobility following hospitalization due to fall with (+) L ankle fracture. Surgery planned for 25. Patient would benefit from skilled OT services addressing above deficits while here at hospital and following discharge to maximize independence for eventual return to prior living arrangements. Currently patient has not demonstrated ability to safely return to prior living arrangements.  Prognosis: Good  Decision Making: Medium Complexity  REQUIRES OT FOLLOW-UP: Yes  Activity Tolerance  Activity Tolerance: Patient limited by pain  Safety Devices  Type of Devices:

## 2025-01-11 NOTE — PROGRESS NOTES
Occupational Therapy    [facility]  Occupational Therapy Not Seen Note    DATE: 2025    NAME: Bibiana Garces  MRN: [id number]   : 1945      Patient not seen this date for Occupational Therapy due to:    Other: Ortho consult pending for L ankle fracture. Continue to pursue OT eval following ortho consult/POC determination.        Electronically signed by JANAK HERNANDEZ OTR/L on 2025 at 8:13 AM

## 2025-01-11 NOTE — PROGRESS NOTES
[facility]  Office: [phone]  Jose Hernandez DO, Jeancarlos Ray DO, German Turcios DO, Sunny Doty DO, Oneil Gan MD, Carmen Greene MD, Julianne Marcum MD, Anais Neri MD,  Prince Srinivasan MD, Adriano Wall MD, Alexandr Choi MD,  Héctor Jones DO, Rosalie Dick MD, Deshaun Hopkins MD, Jeronimo Hernandez DO, Emmanuelle Watkins MD,  Benton Rascon DO, Sandra Del Toro MD, Marilee Prater MD, Layla Sosa MD, Alvin Mittal MD,  Yamil Bower MD, Tello Winter MD, Anne Snell MD, Mohsen Chan MD, Anderson Sanders MD, Marianne aWshburn MD, Butch London DO, Kvng Dupont MD, Héctor Hernandez MD, Mohsin Reza, MD, Shirley Waterhouse, CNP,  Ceci Mancilla CNP, Butch Renee, RUSSEL,  Lela Christian, DELORES, Sabine Johnson, CNP, Kenna Owens, CNP, Tran Jenkins, CNP, Juliana Faust, CNP, Marybel Gonzalez, PA-C, Kaleigh Tristan PA-C, Soumya Ortez, CNP, Damaris Eagle, CNP,  Mabel Ulrich, CNP, Kathe Mccarty, CNP, Jo Kenyon, CNP,  Marycarmen Ross, RUSSEL, Chikis Bell, CNP         [facility]   IN-PATIENT SERVICE   [facility]    Progress Note    1/11/2025    11:09 AM    Name:   Bibiana Garces  MRN:     [id number]     Acct:      [id number]   Room:   [address] [place] [address] Day:  1  Admit Date:  1/10/2025  5:57 PM    PCP:   Marva Reynolds DO  Code Status:  Full Code    Subjective:     C/C:   Chief Complaint   Patient presents with    Ankle Pain     Interval History Status: not changed.     Patient seen and examined at bedside this morning.  No acute events overnight.  Pain is fairly well-controlled.  Denies any chest pain, shortness of breath, lightheadedness, dizziness, fever or chills.    Brief History:     Bibiana Garces is a 79 y.o. female who presents with Ankle Pain and is admitted to the hospital for the management of Trimalleolar fracture of ankle, closed, left, initial encounter. Imaging showed marked malalignment.  She was reduced and splinted in the emergency room.

## 2025-01-11 NOTE — PLAN OF CARE
Problem: Discharge Planning  Goal: Discharge to home or other facility with appropriate resources  Outcome: Progressing  Flowsheets (Taken 1/11/2025 0035)  Discharge to home or other facility with appropriate resources:   Identify barriers to discharge with patient and caregiver   Arrange for needed discharge resources and transportation as appropriate   Identify discharge learning needs (meds, wound care, etc)     Problem: Skin/Tissue Integrity  Goal: Absence of new skin breakdown  Description: 1.  Monitor for areas of redness and/or skin breakdown  2.  Assess vascular access sites hourly  3.  Every 4-6 hours minimum:  Change oxygen saturation probe site  4.  Every 4-6 hours:  If on nasal continuous positive airway pressure, respiratory therapy assess nares and determine need for appliance change or resting period.  Outcome: Progressing     Problem: Safety - Adult  Goal: Free from fall injury  Outcome: Progressing

## 2025-01-11 NOTE — ED TRIAGE NOTES
ED to inpatient nurses report      Chief Complaint:  Chief Complaint   Patient presents with    Ankle Pain     Present to ED from: Home    MOA:     LOC: alert and orientated to name, place, date  Mobility: Requires assistance * 2  Oxygen Baseline: room air    Current needs required: room air   Pending ED orders: none  Present condition: stable    Why did the patient come to the ED? Fall   What is the plan? Admit due to unable to bear weight  Any procedures or intervention occur? Reduced, splinted, pain meds given     Patient received 80 mg of ketamine via squad, and norco for pain    Any safety concerns??none  CODE STATUS Prior  Diet No diet orders on file    Mental Status:  Level of Consciousness: Responds to pain (2)    Psych Assessment:   Psychosocial  Psychosocial (WDL): Within Defined Limits  Vital signs   Vitals:    01/10/25 1804 01/10/25 1830 01/10/25 1831   BP: (!) 200/95 (!) 175/99 (!) 195/105   Pulse: 92 85 81   Resp: 18 17 21   SpO2: 97%  100%        Vitals:  Patient Vitals for the past 24 hrs:   BP Pulse Resp SpO2   01/10/25 1831 (!) 195/105 81 21 100 %   01/10/25 1830 (!) 175/99 85 17 --   01/10/25 1804 (!) 200/95 92 18 97 %      Visit Vitals  BP (!) 195/105   Pulse 81   Resp 21   SpO2 100%        LDAs:      Meds:  Medications   lidocaine 1 % injection 5 mL (5 mLs IntraDERmal Given 1/10/25 1833)   HYDROcodone-acetaminophen (NORCO) 5-325 MG per tablet 1 tablet (1 tablet Oral Given 1/10/25 2005)          Ambulatory Status:  No data recorded    Diagnosis:  DISPOSITION Admitted 01/10/2025 11:36:14 PM   Final diagnoses:   Closed fracture of left ankle, initial encounter            Assessment Abnormalities : (List)  Neuro: Confused   Yes[] No[x]    Respiratory : Labored  Yes[] No[x]  Lung Sounds clear    Cardiac:   Regular  Yes[x] No[]  Irregular Yes[] No[x]      Pain Score:  Pain Assessment  Pain Level: 10    ISOLATION Status  No active isolations    Labs:  Labs Reviewed - No data to  display          ALLERGIES     Antihistamines, diphenhydramine-type; Biaxin [clarithromycin]; Ceclor [cefaclor]; Ceftin [cefuroxime]; Cleocin [clindamycin]; Erythromycin; Naprelan [naproxen]; Prednisone; Relafen [nabumetone]; Suprax [cefixime]; and Zocor [simvastatin]    CURRENT MEDICATIONS       Previous Medications    BETAXOLOL (BETOPTIC-S) 0.25 % OPHTHALMIC SUSPENSION    Place 1 drop into both eyes 2 times daily    ESOMEPRAZOLE MAGNESIUM (NEXIUM) 20 MG PACK    Take 40 mg by mouth daily    ESTROGENS, CONJUGATED, (PREMARIN) 0.625 MG TABLET    Take 0.625 mg by mouth 3 times/week    HYDROCODONE-ACETAMINOPHEN (NORCO) 5-325 MG PER TABLET    Take 1 tablet by mouth every 6 hours as needed for Pain. Pt states she takes 1-2 q 12 hrs prn  [facility] Dr. Chavis    LATANOPROSTENE BUNOD 0.024 % SOLN    Apply 1 drop to eye at bedtime Into affected eye    LORAZEPAM (ATIVAN) 1 MG TABLET    take 1 tablet by mouth once daily if needed for anxiety    ONDANSETRON (ZOFRAN-ODT) 4 MG DISINTEGRATING TABLET    Take 1 tablet by mouth every 8 hours as needed for Nausea or Vomiting    SUMATRIPTAN (IMITREX) 20 MG/ACT NASAL SPRAY    INSTILL 1 SPRAY IN 1 NOSTRIL IF NEEDED AND MAY REPEAT IN 2 HOURS IF NEEDED (MAXIMUM OF 2 SPRAYS IN 24 HOURS)            S)     Orders Placed This Encounter   Medications    lidocaine PF 1 % injection     Zollinger, Hana: cabinet override    lidocaine 1 % injection 5 mL    HYDROcodone-acetaminophen (NORCO) 5-325 MG per tablet 1 tablet       SURGICAL HISTORY       Past Surgical History:   Procedure Laterality Date    APPENDECTOMY      CHOLECYSTECTOMY      COLONOSCOPY      CYSTOSCOPY N/A 02/15/2023    CYSTOSCOPY TUR  BLADDER TUMOR    CYSTOSCOPY N/A 2/15/2023    CYSTOSCOPY TUR  BLADDER TUMOR performed by Haridner Dodson MD at [facility] OR    ENDOSCOPY, COLON, DIAGNOSTIC      EYE SURGERY      FOOT SURGERY Right     twice    HYSTERECTOMY (CERVIX STATUS UNKNOWN)  1978    KNEE CARTILAGE SURGERY Right     SMALL

## 2025-01-11 NOTE — PLAN OF CARE
Problem: Discharge Planning  Goal: Discharge to home or other facility with appropriate resources  Outcome: Progressing  Flowsheets (Taken 1/11/2025 4429)  Discharge to home or other facility with appropriate resources:   Identify barriers to discharge with patient and caregiver   Arrange for needed discharge resources and transportation as appropriate   Identify discharge learning needs (meds, wound care, etc)     Problem: Skin/Tissue Integrity  Goal: Absence of new skin breakdown  Description: 1.  Monitor for areas of redness and/or skin breakdown  2.  Assess vascular access sites hourly  3.  Every 4-6 hours minimum:  Change oxygen saturation probe site  4.  Every 4-6 hours:  If on nasal continuous positive airway pressure, respiratory therapy assess nares and determine need for appliance change or resting period.  Outcome: Progressing     Problem: Safety - Adult  Goal: Free from fall injury  Outcome: Progressing

## 2025-01-11 NOTE — ED PROVIDER NOTES
[address]  [address] [facility] [address]  Phone: [phone]      Pt Name: Bibiana Garces  MRN:[id number]  Birthdate 1945  Date of evaluation: 1/10/2025      CHIEF COMPLAINT       Chief Complaint   Patient presents with    Ankle Pain       HISTORY OF PRESENT ILLNESS   79-year-old female presents here via EMS with an obvious left ankle deformity.  She was walking outside this evening in the snow when she slipped on pavement, foot hit the curb, she fell to the ground.  There is no apparent head trauma.  There was an obvious deformity of the right ankle.  EMS administered 80 mg intramuscular ketamine, splinted.    REVIEWOF SYSTEMS     Review of Systems      PAST MEDICAL HISTORY    has a past medical history of Acute urinary retention, Arrhythmia, Arthritis, Bursitis of left hip, Chronic back pain, COVID-19, Glaucoma, Hiatal hernia, Hyperlipidemia, Insomnia, Poor historian, Small bowel obstruction (HCC), TIA (transient ischemic attack), Under care of team, Under care of team, Vasovagal near syncope, Vitamin D deficiency, and Wellness examination.    SURGICAL HISTORY      has a past surgical history that includes Hysterectomy (1978); Small intestine surgery; Cholecystectomy; Appendectomy; Tonsillectomy; eye surgery; Foot surgery (Right); Colonoscopy; Endoscopy, colon, diagnostic; Knee cartilage surgery (Right); Cystoscopy (N/A, 02/15/2023); and Cystoscopy (N/A, 2/15/2023).    CURRENTMEDICATIONS       Current Discharge Medication List        CONTINUE these medications which have NOT CHANGED    Details   ondansetron (ZOFRAN-ODT) 4 MG disintegrating tablet Take 1 tablet by mouth every 8 hours as needed for Nausea or Vomiting  Qty: 20 tablet, Refills: 0      betaxolol (BETOPTIC-S) 0.25 % ophthalmic suspension Place 1 drop into both eyes 2 times daily      Latanoprostene Bunod 0.024 % SOLN Apply 1 drop to eye at bedtime Into affected eye      LORazepam (ATIVAN) 1 MG tablet take 1 tablet by mouth once

## 2025-01-11 NOTE — PROGRESS NOTES
Physical Therapy        Physical Therapy Cancel Note      DATE: 2025    NAME: Bibiana Garces  MRN: [id number]   : 1945      Patient not seen this date for Physical Therapy due to:    Other: ortho consult pending for L ankle fracture       Electronically signed by Stephanie Johnson PT on 2025 at 7:47 AM

## 2025-01-11 NOTE — PROGRESS NOTES
Physical Therapy  Facility/Department: [address]   Physical Therapy Initial Evaluation    Patient Name: Bibiana Garces        MRN: [id number]    : 1945    Date of Service: 2025    Chief Complaint   Patient presents with    Ankle Pain     Past Medical History:  has a past medical history of Acute urinary retention, Arrhythmia, Arthritis, Bursitis of left hip, Chronic back pain, COVID-19, Glaucoma, Hiatal hernia, Hyperlipidemia, Insomnia, Poor historian, Small bowel obstruction (HCC), TIA (transient ischemic attack), Under care of team, Under care of team, Vasovagal near syncope, Vitamin D deficiency, and Wellness examination.  Past Surgical History:  has a past surgical history that includes Hysterectomy (); Small intestine surgery; Cholecystectomy; Appendectomy; Tonsillectomy; eye surgery; Foot surgery (Right); Colonoscopy; Endoscopy, colon, diagnostic; Knee cartilage surgery (Right); Cystoscopy (N/A, 02/15/2023); and Cystoscopy (N/A, 2/15/2023).    Discharge Recommendations  Discharge Recommendations: Therapy recommended at discharge, Patient able to tolerate 3 hours of therapy per day  PT Equipment Recommendations  Equipment Needed: Yes (TBD post surgery and therapy)    Assessment  Body Structures, Functions, Activity Limitations Requiring Skilled Therapeutic Intervention: Decreased functional mobility , Decreased safe awareness, Decreased body mechanics, Decreased balance, Increased pain  Assessment: The patient was admitted after falling at home and sustaining a L ankle trimalleolar fracture. Her ankle is currently splinted and she is scheduled for surgery on 2025 with ortho. She is currently NWB on LLE. The patient is very independent at baseline, as she completes all ADLs independently and ambulates without an AD. This is the only fall that the patient has sustained in the past year. During evaluation, the patient was able to perform bed mobility with SBA-Williams. She was unable to achieve  reduction, scheduled for surgery on 1/13/2025: NWB on LLE at this time    Subjective  General  Patient assessed for rehabilitation services?: Yes  Additional Pertinent Hx: bladder cancer, s/p multiple surgeries: reports having difficulties voiding fully due to this  Family/Caregiver Present: No  Diagnosis: L ankle trimalleolar fracture  Follows Commands: Within Functional Limits  Subjective  Subjective: The patient is agreeable to therapy. Hopes to be able to get up to the toilet.  Pain  Pre-Pain: 5  Post-Pain: 4  Pain Location: Left;Ankle  Pain Interventions: Repositioning;Other (Comment);Ice  Also reports pain in R knee that is chronic, but the patient did not rate.    Home Setup/Prior Level of Function  Social/Functional History  Lives With: Alone  Type of Home: Apartment (1st floor)  Home Layout: One level  Home Access: Level entry  Bathroom Shower/Tub: Tub/Shower unit  Bathroom Toilet: Handicap height  Bathroom Equipment: Grab bars in shower, Grab bars around toilet  Bathroom Accessibility: Walker accessible  Has the patient had two or more falls in the past year or any fall with injury in the past year?: No (just the recent fall prior to this admission)  Prior Level of Assist for ADLs: Independent  Prior Level of Assist for Homemaking: Independent  Homemaking Responsibilities: Yes  Meal Prep Responsibility: Primary  Laundry Responsibility: Primary  Cleaning Responsibility: Primary  Bill Paying/Finance Responsibility: Primary  Shopping Responsibility: Primary  Health Care Management: Primary  Prior Level of Assist for Ambulation: Independent household ambulator, with or without device (Independent without device)  Prior Level of Assist for Transfers: Independent  Active : Yes  Mode of Transportation: Car  Occupation: Retired  Type of Occupation: [employer]  Leisure & Hobbies: dine out, Religion functions, concerts, spend time with friends  Additional Comments: R hand dominant    Vision/Hearing  Vision  Vision:

## 2025-01-12 PROCEDURE — 2500000003 HC RX 250 WO HCPCS: Performed by: INTERNAL MEDICINE

## 2025-01-12 PROCEDURE — 6370000000 HC RX 637 (ALT 250 FOR IP): Performed by: STUDENT IN AN ORGANIZED HEALTH CARE EDUCATION/TRAINING PROGRAM

## 2025-01-12 PROCEDURE — 6360000002 HC RX W HCPCS: Performed by: INTERNAL MEDICINE

## 2025-01-12 PROCEDURE — 1200000000 HC SEMI PRIVATE

## 2025-01-12 PROCEDURE — 99232 SBSQ HOSP IP/OBS MODERATE 35: CPT | Performed by: STUDENT IN AN ORGANIZED HEALTH CARE EDUCATION/TRAINING PROGRAM

## 2025-01-12 PROCEDURE — 6370000000 HC RX 637 (ALT 250 FOR IP): Performed by: INTERNAL MEDICINE

## 2025-01-12 PROCEDURE — 97110 THERAPEUTIC EXERCISES: CPT

## 2025-01-12 PROCEDURE — 97530 THERAPEUTIC ACTIVITIES: CPT

## 2025-01-12 RX ORDER — BISACODYL 5 MG/1
5 TABLET, DELAYED RELEASE ORAL DAILY PRN
Status: DISCONTINUED | OUTPATIENT
Start: 2025-01-12 | End: 2025-01-14

## 2025-01-12 RX ADMIN — HYDROCODONE BITARTRATE AND ACETAMINOPHEN 2 TABLET: 5; 325 TABLET ORAL at 18:28

## 2025-01-12 RX ADMIN — HYDROMORPHONE HYDROCHLORIDE 0.5 MG: 1 INJECTION, SOLUTION INTRAMUSCULAR; INTRAVENOUS; SUBCUTANEOUS at 15:06

## 2025-01-12 RX ADMIN — BISACODYL 5 MG: 5 TABLET, COATED ORAL at 12:00

## 2025-01-12 RX ADMIN — HYDROCODONE BITARTRATE AND ACETAMINOPHEN 1 TABLET: 5; 325 TABLET ORAL at 11:59

## 2025-01-12 RX ADMIN — PANTOPRAZOLE SODIUM 40 MG: 40 TABLET, DELAYED RELEASE ORAL at 09:21

## 2025-01-12 RX ADMIN — TIMOLOL MALEATE 1 DROP: 2.5 SOLUTION/ DROPS OPHTHALMIC at 09:22

## 2025-01-12 RX ADMIN — HYDROCODONE BITARTRATE AND ACETAMINOPHEN 2 TABLET: 5; 325 TABLET ORAL at 06:03

## 2025-01-12 RX ADMIN — LORAZEPAM 1 MG: 1 TABLET ORAL at 21:37

## 2025-01-12 RX ADMIN — SODIUM CHLORIDE, PRESERVATIVE FREE 10 ML: 5 INJECTION INTRAVENOUS at 09:22

## 2025-01-12 RX ADMIN — ENOXAPARIN SODIUM 40 MG: 100 INJECTION SUBCUTANEOUS at 09:21

## 2025-01-12 RX ADMIN — HYDROCODONE BITARTRATE AND ACETAMINOPHEN 1 TABLET: 5; 325 TABLET ORAL at 23:30

## 2025-01-12 ASSESSMENT — PAIN SCALES - GENERAL
PAINLEVEL_OUTOF10: 5
PAINLEVEL_OUTOF10: 7
PAINLEVEL_OUTOF10: 8
PAINLEVEL_OUTOF10: 4
PAINLEVEL_OUTOF10: 6
PAINLEVEL_OUTOF10: 4
PAINLEVEL_OUTOF10: 4
PAINLEVEL_OUTOF10: 2
PAINLEVEL_OUTOF10: 10
PAINLEVEL_OUTOF10: 5

## 2025-01-12 ASSESSMENT — PAIN DESCRIPTION - ORIENTATION
ORIENTATION: LEFT

## 2025-01-12 ASSESSMENT — PAIN DESCRIPTION - LOCATION
LOCATION: ANKLE
LOCATION: LEG;BACK
LOCATION: ANKLE

## 2025-01-12 ASSESSMENT — PAIN SCALES - WONG BAKER: WONGBAKER_NUMERICALRESPONSE: NO HURT

## 2025-01-12 ASSESSMENT — PAIN DESCRIPTION - PAIN TYPE: TYPE: ACUTE PAIN

## 2025-01-12 ASSESSMENT — PAIN DESCRIPTION - ONSET: ONSET: ON-GOING

## 2025-01-12 ASSESSMENT — PAIN DESCRIPTION - DESCRIPTORS
DESCRIPTORS: ACHING;THROBBING
DESCRIPTORS: ACHING
DESCRIPTORS: ACHING;THROBBING
DESCRIPTORS: THROBBING
DESCRIPTORS: ACHING;DISCOMFORT
DESCRIPTORS: ACHING;THROBBING

## 2025-01-12 ASSESSMENT — PAIN - FUNCTIONAL ASSESSMENT
PAIN_FUNCTIONAL_ASSESSMENT: PREVENTS OR INTERFERES SOME ACTIVE ACTIVITIES AND ADLS
PAIN_FUNCTIONAL_ASSESSMENT: PREVENTS OR INTERFERES SOME ACTIVE ACTIVITIES AND ADLS

## 2025-01-12 ASSESSMENT — PAIN DESCRIPTION - FREQUENCY: FREQUENCY: CONTINUOUS

## 2025-01-12 NOTE — PLAN OF CARE
Problem: Discharge Planning  Goal: Discharge to home or other facility with appropriate resources  1/12/2025 0122 by Patty Soto LPN  Outcome: Progressing     Problem: Skin/Tissue Integrity  Goal: Absence of new skin breakdown  Description: 1.  Monitor for areas of redness and/or skin breakdown  2.  Assess vascular access sites hourly  3.  Every 4-6 hours minimum:  Change oxygen saturation probe site  4.  Every 4-6 hours:  If on nasal continuous positive airway pressure, respiratory therapy assess nares and determine need for appliance change or resting period.  1/12/2025 0122 by Patty Soto LPN  Outcome: Progressing     Problem: Safety - Adult  Goal: Free from fall injury  1/12/2025 0122 by Patty Soto LPN  Outcome: Progressing

## 2025-01-12 NOTE — CARE COORDINATION
Case Management Assessment  Initial Evaluation    Date/Time of Evaluation: 1/12/2025 2:39 PM  Assessment Completed by: Ruby Lockhart    If patient is discharged prior to next notation, then this note serves as note for discharge by case management.    Patient Name: Bibiana Garces                   YOB: 1945  Diagnosis: Closed fracture of left ankle, initial encounter [S82.892A]  Trimalleolar fracture of ankle, closed, left, initial encounter [S82.852A]                   Date / Time: 1/10/2025  5:57 PM    Patient Admission Status: Inpatient   Readmission Risk (Low < 19, Mod (19-27), High > 27): Readmission Risk Score: 7.6    Current PCP: Marva Reynolds, DO  PCP verified by CM? Yes    Chart Reviewed: Yes      History Provided by: Patient  Patient Orientation: Alert and Oriented    Patient Cognition: Alert    Hospitalization in the last 30 days (Readmission):  No    If yes, Readmission Assessment in CM Navigator will be completed.    Advance Directives:      Code Status: Full Code   Patient's Primary Decision Maker is: Legal Next of Kin (son, Zach)    Primary Decision Maker: Zach Garces - Child - [phone]    Discharge Planning:    Patient lives with: Alone (Dr. Sarah Sahu) Type of Home: Apartment  Primary Care Giver: Self  Patient Support Systems include: Family Members, Friends/Neighbors   Current Financial resources: Medicare  Current [facility] resources: [facility] (Comment[facility]  Current services prior to admission: None            Current DME:              Type of Home Care services:  None    ADLS  Prior functional level: Independent in ADLs/IADLs  Current functional level: Assistance with the following:, Bathing, Cooking, Housework, Shopping, Mobility    PT AM-PAC: 10 /24  OT AM-PAC: 15 /24    Family can provide assistance at DC: Other (comment) (some friends may be able to assist at home)  Would you like Case Management to discuss the discharge  left, initial encounter [Z89.948C]    IF APPLICABLE: The Patient and/or patient representative Bibiana and her family were provided with a choice of provider and agrees with the discharge plan. Freedom of choice list with basic dialogue that supports the patient's individualized plan of care/goals and shares the quality data associated with the providers was provided to: Patient   Patient Representative Name:       The Patient and/or Patient Representative Agree with the Discharge Plan? Yes (TBD)    Ruby Lockhart  Case Management Department                       Post Acute Facility/Agency List     Provided patient with the following list, the list includes the overall star ratings obtained from CMS per the Medicare Web site (www.Medicare.gov):     [] Long Term Acute Care Facilities  [x] Acute Inpatient Rehabilitation Facilities  [x] Skilled Nursing Facilities  [] Hospice Facilities  [] Home Care    Provided verbal instructions on how to utilize the QR Code to obtain additional detailed star ratings from www.Medicare.gov     offered to print and provide the detailed list:    []Accepted   [x]Declined

## 2025-01-12 NOTE — PROGRESS NOTES
[facility]  Office: [phone]  Jose Hernandez DO, Jeancarlos Ray DO, German Turcios DO, Sunny Doty DO, Oneil Gan MD, Carmen Greene MD, Julianne Marcum MD, Anais Neri MD,  Prince Srinivasan MD, Adriano Wall MD, Alexandr Choi MD,  Héctor Jones DO, Rosalie Dick MD, Deshaun Hopkins MD, Jeronimo Hernandez DO, Emmanuelle Watkins MD,  Benton Rascon DO, Sandra Del Toro MD, Marilee Prater MD, Layla Sosa MD, Alvin Mittal MD,  Yamil Bower MD, Tello Winter MD, Anne Snell MD, Mohsen Chan MD, Anderson Sanders MD, Marianne Washburn MD, Butch London DO, Kvng Dupont MD, Héctor Hernandez MD, Mohsin Reza, MD, Shirley Waterhouse, CNP,  Ceci Mancilla CNP, Butch Renee, RUSSEL,  Lela Christian, DELORES, Sabine Johnson, CNP, Kenna Owens, CNP, Tran Jenkins, CNP, Juliana Faust, CNP, Marybel Gonzalez, PA-C, Kaleigh Tristan PA-C, Soumya Ortez, CNP, Damaris Eagle, CNP,  Mabel Ulrich, CNP, Kathe Mccarty, CNP, Jo Kenyon, CNP,  Marycarmen Ross CNP, Chikis Bell, CNP         [facility]   IN-PATIENT SERVICE   [facility]    Progress Note    1/12/2025    8:03 AM    Name:   Bibiana Garces  MRN:     [id number]     Acct:      [id number]   Room:   [address] [place] [address] Day:  2  Admit Date:  1/10/2025  5:57 PM    PCP:   Marva Reynolds DO  Code Status:  Full Code    Subjective:     C/C:   Chief Complaint   Patient presents with    Ankle Pain     Interval History Status: not changed.     Patient seen and examined at bedside this morning.  No acute events overnight.  Resting comfortably.  Pain is well-controlled.  Denies any chest pain, shortness of breath, lightheadedness or dizziness.  Plan is for patient to be n.p.o. at midnight with plans for surgery tomorrow with Ortho.  Patient denies having any questions at this time    Brief History:     Bibiana Garces is a 79 y.o. female who presents with Ankle Pain and is admitted to the hospital for the management of Trimalleolar fracture of  ankle, closed, left, initial encounter. Imaging showed marked malalignment.  She was reduced and splinted in the emergency room.  Orthopedic surgery consulted in the ED, recommended operative intervention.  Plan for surgery Monday morning.  Continue pain control    Review of Systems:     Constitutional:  negative for chills, fevers, sweats  Respiratory:  negative for cough, dyspnea on exertion, shortness of breath, wheezing  Cardiovascular:  negative for chest pain, chest pressure/discomfort, lower extremity edema, palpitations  Gastrointestinal:  negative for abdominal pain, constipation, diarrhea, nausea, vomiting  Neurological:  negative for dizziness, headache  MSK: left ankle pain, no numbness or tingling    Medications:     Allergies:    Allergies   Allergen Reactions    Antihistamines, Diphenhydramine-Type Other (See Comments)     Bp drops and I pass out. If given in large amount    Biaxin [Clarithromycin] Hives     Abdominal pain    Ceclor [Cefaclor]      Hives and abdominal pain    Ceftin [Cefuroxime] Other (See Comments)     Hives and abdominal pain    Cleocin [Clindamycin] Other (See Comments)     Hives and abdominal pain    Erythromycin      Hives and abdominal pain    Naprelan [Naproxen] Hives    Prednisone Other (See Comments)     Hives and abdominal pain    Relafen [Nabumetone] Other (See Comments)     Hives and abdominal pain    Suprax [Cefixime] Other (See Comments)     Hives and abdominal pain    Zocor [Simvastatin] Other (See Comments)     Abdominal pain       Current Meds:   Scheduled Meds:    sodium chloride flush  5-40 mL IntraVENous 2 times per day    enoxaparin  40 mg SubCUTAneous Daily    pantoprazole  40 mg Oral QAM AC    timolol  1 drop Both Eyes BID    Latanoprostene Bunod  1 drop Ophthalmic Nightly     Continuous Infusions:    sodium chloride       PRN Meds: HYDROcodone 5 mg - acetaminophen **OR** HYDROcodone 5 mg - acetaminophen, HYDROmorphone, sodium chloride flush, sodium chloride,

## 2025-01-12 NOTE — PROGRESS NOTES
Physical Therapy  Facility/Department: [address]   Physical Therapy Daily Treatment Note    Patient Name: Bibiana Garces        MRN: [id number]    : 1945    Date of Service: 2025    Chief Complaint   Patient presents with    Ankle Pain     Past Medical History:  has a past medical history of Acute urinary retention, Arrhythmia, Arthritis, Bursitis of left hip, Chronic back pain, COVID-19, Glaucoma, Hiatal hernia, Hyperlipidemia, Insomnia, Poor historian, Small bowel obstruction (HCC), TIA (transient ischemic attack), Under care of team, Under care of team, Vasovagal near syncope, Vitamin D deficiency, and Wellness examination.  Past Surgical History:  has a past surgical history that includes Hysterectomy (); Small intestine surgery; Cholecystectomy; Appendectomy; Tonsillectomy; eye surgery; Foot surgery (Right); Colonoscopy; Endoscopy, colon, diagnostic; Knee cartilage surgery (Right); Cystoscopy (N/A, 02/15/2023); and Cystoscopy (N/A, 2/15/2023).    Discharge Recommendations  Discharge Recommendations: Therapy recommended at discharge, Patient able to tolerate 3 hours of therapy per day  PT Equipment Recommendations  Equipment Needed: Yes (TBD)    Assessment  Body Structures, Functions, Activity Limitations Requiring Skilled Therapeutic Intervention: Decreased functional mobility ;Decreased safe awareness;Decreased body mechanics;Decreased balance;Increased pain  Assessment: The patient was admitted after falling at home and sustaining a L ankle trimalleolar fracture. Her ankle is currently splinted and she is scheduled for surgery on 2025 with ortho. She is currently NWB on LLE. The patient is very independent at baseline, as she completes all ADLs independently and ambulates without an AD. This is the only fall that the patient has sustained in the past year. Currently, the patient was able to perform bed mobility with Williams at most. She was able to perform scooting with Williams. Treatment was

## 2025-01-12 NOTE — PLAN OF CARE
Problem: Discharge Planning  Goal: Discharge to home or other facility with appropriate resources  1/12/2025 0121 by Patty Soto LPN  Outcome: Progressing     Problem: Skin/Tissue Integrity  Goal: Absence of new skin breakdown  Description: 1.  Monitor for areas of redness and/or skin breakdown  2.  Assess vascular access sites hourly  3.  Every 4-6 hours minimum:  Change oxygen saturation probe site  4.  Every 4-6 hours:  If on nasal continuous positive airway pressure, respiratory therapy assess nares and determine need for appliance change or resting period.  1/12/2025 0121 by Patty Soto LPN  Outcome: Progressing     Problem: Safety - Adult  Goal: Free from fall injury  1/12/2025 0121 by Patty Soto LPN  Outcome: Progressing

## 2025-01-12 NOTE — PLAN OF CARE
Problem: Discharge Planning  Goal: Discharge to home or other facility with appropriate resources  1/12/2025 1301 by April Clarke, RN  Outcome: Progressing  Flowsheets (Taken 1/12/2025 0900)  Discharge to home or other facility with appropriate resources:   Identify barriers to discharge with patient and caregiver   Arrange for needed discharge resources and transportation as appropriate   Identify discharge learning needs (meds, wound care, etc)     Problem: Skin/Tissue Integrity  Goal: Absence of new skin breakdown  Description: 1.  Monitor for areas of redness and/or skin breakdown  2.  Assess vascular access sites hourly  3.  Every 4-6 hours minimum:  Change oxygen saturation probe site  4.  Every 4-6 hours:  If on nasal continuous positive airway pressure, respiratory therapy assess nares and determine need for appliance change or resting period.  1/12/2025 1301 by April Clarke, RN  Outcome: Progressing     Problem: Safety - Adult  Goal: Free from fall injury  1/12/2025 1301 by April Clarke, RN  Outcome: Progressing

## 2025-01-13 ENCOUNTER — ANESTHESIA (OUTPATIENT)
Dept: OPERATING ROOM | Age: 80
End: 2025-01-13
Payer: COMMERCIAL

## 2025-01-13 ENCOUNTER — APPOINTMENT (OUTPATIENT)
Dept: GENERAL RADIOLOGY | Age: 80
End: 2025-01-13
Payer: COMMERCIAL

## 2025-01-13 PROBLEM — S82.892A CLOSED FRACTURE OF LEFT ANKLE: Status: ACTIVE | Noted: 2025-01-13

## 2025-01-13 LAB
ANION GAP SERPL CALCULATED.3IONS-SCNC: 8 MMOL/L (ref 9–17)
BASOPHILS # BLD: 0.1 K/UL (ref 0–0.2)
BASOPHILS NFR BLD: 1 % (ref 0–2)
BUN SERPL-MCNC: 8 MG/DL (ref 8–23)
CALCIUM SERPL-MCNC: 8.9 MG/DL (ref 8.6–10.4)
CHLORIDE SERPL-SCNC: 101 MMOL/L (ref 98–107)
CO2 SERPL-SCNC: 28 MMOL/L (ref 20–31)
CREAT SERPL-MCNC: 0.7 MG/DL (ref 0.5–0.9)
EOSINOPHIL # BLD: 0.3 K/UL (ref 0–0.4)
EOSINOPHILS RELATIVE PERCENT: 4 % (ref 1–4)
ERYTHROCYTE [DISTWIDTH] IN BLOOD BY AUTOMATED COUNT: 13.5 % (ref 12.5–15.4)
GFR, ESTIMATED: 88 ML/MIN/1.73M2
GLUCOSE SERPL-MCNC: 101 MG/DL (ref 70–99)
HCT VFR BLD AUTO: 37.3 % (ref 36–46)
HGB BLD-MCNC: 12.5 G/DL (ref 12–16)
LYMPHOCYTES NFR BLD: 2 K/UL (ref 1–4.8)
LYMPHOCYTES RELATIVE PERCENT: 25 % (ref 24–44)
MCH RBC QN AUTO: 31.4 PG (ref 26–34)
MCHC RBC AUTO-ENTMCNC: 33.4 G/DL (ref 31–37)
MCV RBC AUTO: 94.1 FL (ref 80–100)
MONOCYTES NFR BLD: 0.7 K/UL (ref 0.1–1.2)
MONOCYTES NFR BLD: 9 % (ref 2–11)
NEUTROPHILS NFR BLD: 61 % (ref 36–66)
NEUTS SEG NFR BLD: 4.9 K/UL (ref 1.8–7.7)
PLATELET # BLD AUTO: 295 K/UL (ref 140–450)
PMV BLD AUTO: 7.8 FL (ref 6–12)
POTASSIUM SERPL-SCNC: 3.6 MMOL/L (ref 3.7–5.3)
RBC # BLD AUTO: 3.97 M/UL (ref 4–5.2)
SODIUM SERPL-SCNC: 137 MMOL/L (ref 135–144)
WBC OTHER # BLD: 8 K/UL (ref 3.5–11)

## 2025-01-13 PROCEDURE — 97530 THERAPEUTIC ACTIVITIES: CPT

## 2025-01-13 PROCEDURE — 6360000002 HC RX W HCPCS

## 2025-01-13 PROCEDURE — 7100000001 HC PACU RECOVERY - ADDTL 15 MIN: Performed by: ORTHOPAEDIC SURGERY

## 2025-01-13 PROCEDURE — 2500000003 HC RX 250 WO HCPCS: Performed by: INTERNAL MEDICINE

## 2025-01-13 PROCEDURE — 7100000000 HC PACU RECOVERY - FIRST 15 MIN: Performed by: ORTHOPAEDIC SURGERY

## 2025-01-13 PROCEDURE — 2700000000 HC OXYGEN THERAPY PER DAY

## 2025-01-13 PROCEDURE — 93005 ELECTROCARDIOGRAM TRACING: CPT | Performed by: ANESTHESIOLOGY

## 2025-01-13 PROCEDURE — 6370000000 HC RX 637 (ALT 250 FOR IP): Performed by: STUDENT IN AN ORGANIZED HEALTH CARE EDUCATION/TRAINING PROGRAM

## 2025-01-13 PROCEDURE — 6360000002 HC RX W HCPCS: Performed by: ANESTHESIOLOGY

## 2025-01-13 PROCEDURE — 80048 BASIC METABOLIC PNL TOTAL CA: CPT

## 2025-01-13 PROCEDURE — 2580000003 HC RX 258: Performed by: NURSE ANESTHETIST, CERTIFIED REGISTERED

## 2025-01-13 PROCEDURE — 6360000002 HC RX W HCPCS: Performed by: NURSE ANESTHETIST, CERTIFIED REGISTERED

## 2025-01-13 PROCEDURE — 36415 COLL VENOUS BLD VENIPUNCTURE: CPT

## 2025-01-13 PROCEDURE — 97116 GAIT TRAINING THERAPY: CPT

## 2025-01-13 PROCEDURE — 51798 US URINE CAPACITY MEASURE: CPT

## 2025-01-13 PROCEDURE — 2720000010 HC SURG SUPPLY STERILE: Performed by: ORTHOPAEDIC SURGERY

## 2025-01-13 PROCEDURE — C1769 GUIDE WIRE: HCPCS | Performed by: ORTHOPAEDIC SURGERY

## 2025-01-13 PROCEDURE — 1200000000 HC SEMI PRIVATE

## 2025-01-13 PROCEDURE — 64445 NJX AA&/STRD SCIATIC NRV IMG: CPT | Performed by: ANESTHESIOLOGY

## 2025-01-13 PROCEDURE — 82947 ASSAY GLUCOSE BLOOD QUANT: CPT

## 2025-01-13 PROCEDURE — 94761 N-INVAS EAR/PLS OXIMETRY MLT: CPT

## 2025-01-13 PROCEDURE — 85025 COMPLETE CBC W/AUTO DIFF WBC: CPT

## 2025-01-13 PROCEDURE — 2709999900 HC NON-CHARGEABLE SUPPLY: Performed by: ORTHOPAEDIC SURGERY

## 2025-01-13 PROCEDURE — 6360000002 HC RX W HCPCS: Performed by: INTERNAL MEDICINE

## 2025-01-13 PROCEDURE — 3700000001 HC ADD 15 MINUTES (ANESTHESIA): Performed by: ORTHOPAEDIC SURGERY

## 2025-01-13 PROCEDURE — 6370000000 HC RX 637 (ALT 250 FOR IP): Performed by: INTERNAL MEDICINE

## 2025-01-13 PROCEDURE — 99232 SBSQ HOSP IP/OBS MODERATE 35: CPT | Performed by: STUDENT IN AN ORGANIZED HEALTH CARE EDUCATION/TRAINING PROGRAM

## 2025-01-13 PROCEDURE — 3600000004 HC SURGERY LEVEL 4 BASE: Performed by: ORTHOPAEDIC SURGERY

## 2025-01-13 PROCEDURE — 97535 SELF CARE MNGMENT TRAINING: CPT

## 2025-01-13 PROCEDURE — 0QSH04Z REPOSITION LEFT TIBIA WITH INTERNAL FIXATION DEVICE, OPEN APPROACH: ICD-10-PCS | Performed by: ORTHOPAEDIC SURGERY

## 2025-01-13 PROCEDURE — C1713 ANCHOR/SCREW BN/BN,TIS/BN: HCPCS | Performed by: ORTHOPAEDIC SURGERY

## 2025-01-13 PROCEDURE — 64447 NJX AA&/STRD FEMORAL NRV IMG: CPT | Performed by: ANESTHESIOLOGY

## 2025-01-13 PROCEDURE — 0QSK04Z REPOSITION LEFT FIBULA WITH INTERNAL FIXATION DEVICE, OPEN APPROACH: ICD-10-PCS | Performed by: ORTHOPAEDIC SURGERY

## 2025-01-13 PROCEDURE — 51702 INSERT TEMP BLADDER CATH: CPT

## 2025-01-13 PROCEDURE — 3600000014 HC SURGERY LEVEL 4 ADDTL 15MIN: Performed by: ORTHOPAEDIC SURGERY

## 2025-01-13 PROCEDURE — 3700000000 HC ANESTHESIA ATTENDED CARE: Performed by: ORTHOPAEDIC SURGERY

## 2025-01-13 DEVICE — PLATE BNE L86MM 4 H L DST LAT FIBULAR S STL LOK COMPR FOR: Type: IMPLANTABLE DEVICE | Site: ANKLE | Status: FUNCTIONAL

## 2025-01-13 DEVICE — PLATE BNE L62MM 3 H S STL LOK COMPR FOR 3.5MM SCR LCP: Type: IMPLANTABLE DEVICE | Site: ANKLE | Status: FUNCTIONAL

## 2025-01-13 DEVICE — SCREW BNE L18MM DIA2.7MM CORT S STL ST LOK FULL THRD T8: Type: IMPLANTABLE DEVICE | Site: ANKLE | Status: FUNCTIONAL

## 2025-01-13 DEVICE — IMPLANTABLE DEVICE: Type: IMPLANTABLE DEVICE | Site: ANKLE | Status: FUNCTIONAL

## 2025-01-13 DEVICE — SCREW BNE L40MM DIA4MM CANC S STL ST CANN NONLOCKING FULL: Type: IMPLANTABLE DEVICE | Site: ANKLE | Status: FUNCTIONAL

## 2025-01-13 DEVICE — SCREW BNE L12MM DIA2.7MM CORT S STL ST LOK FULL THRD T8: Type: IMPLANTABLE DEVICE | Site: ANKLE | Status: FUNCTIONAL

## 2025-01-13 DEVICE — SCREW BNE L16MM DIA2.7MM CORT S STL ST LOK FULL THRD T8: Type: IMPLANTABLE DEVICE | Site: ANKLE | Status: FUNCTIONAL

## 2025-01-13 RX ORDER — SODIUM CHLORIDE 9 MG/ML
INJECTION, SOLUTION INTRAVENOUS PRN
Status: DISCONTINUED | OUTPATIENT
Start: 2025-01-13 | End: 2025-01-13 | Stop reason: HOSPADM

## 2025-01-13 RX ORDER — MIDAZOLAM HYDROCHLORIDE 2 MG/2ML
2 INJECTION, SOLUTION INTRAMUSCULAR; INTRAVENOUS
Status: DISCONTINUED | OUTPATIENT
Start: 2025-01-13 | End: 2025-01-13 | Stop reason: HOSPADM

## 2025-01-13 RX ORDER — OXYCODONE HYDROCHLORIDE 5 MG/1
10 TABLET ORAL PRN
Status: DISCONTINUED | OUTPATIENT
Start: 2025-01-13 | End: 2025-01-13 | Stop reason: HOSPADM

## 2025-01-13 RX ORDER — METOCLOPRAMIDE HYDROCHLORIDE 5 MG/ML
10 INJECTION INTRAMUSCULAR; INTRAVENOUS
Status: DISCONTINUED | OUTPATIENT
Start: 2025-01-13 | End: 2025-01-13 | Stop reason: HOSPADM

## 2025-01-13 RX ORDER — ONDANSETRON 2 MG/ML
4 INJECTION INTRAMUSCULAR; INTRAVENOUS
Status: DISCONTINUED | OUTPATIENT
Start: 2025-01-13 | End: 2025-01-13 | Stop reason: HOSPADM

## 2025-01-13 RX ORDER — FENTANYL CITRATE 50 UG/ML
INJECTION, SOLUTION INTRAMUSCULAR; INTRAVENOUS
Status: COMPLETED
Start: 2025-01-13 | End: 2025-01-13

## 2025-01-13 RX ORDER — SODIUM CHLORIDE 0.9 % (FLUSH) 0.9 %
5-40 SYRINGE (ML) INJECTION EVERY 12 HOURS SCHEDULED
Status: DISCONTINUED | OUTPATIENT
Start: 2025-01-13 | End: 2025-01-13 | Stop reason: HOSPADM

## 2025-01-13 RX ORDER — GLYCOPYRROLATE 0.2 MG/ML
INJECTION INTRAMUSCULAR; INTRAVENOUS
Status: DISCONTINUED | OUTPATIENT
Start: 2025-01-13 | End: 2025-01-13 | Stop reason: SDUPTHER

## 2025-01-13 RX ORDER — MIDAZOLAM HYDROCHLORIDE 1 MG/ML
INJECTION, SOLUTION INTRAMUSCULAR; INTRAVENOUS
Status: COMPLETED | OUTPATIENT
Start: 2025-01-13 | End: 2025-01-13

## 2025-01-13 RX ORDER — SODIUM CHLORIDE, SODIUM LACTATE, POTASSIUM CHLORIDE, CALCIUM CHLORIDE 600; 310; 30; 20 MG/100ML; MG/100ML; MG/100ML; MG/100ML
INJECTION, SOLUTION INTRAVENOUS
Status: DISCONTINUED | OUTPATIENT
Start: 2025-01-13 | End: 2025-01-13 | Stop reason: SDUPTHER

## 2025-01-13 RX ORDER — PROPOFOL 10 MG/ML
INJECTION, EMULSION INTRAVENOUS
Status: DISCONTINUED | OUTPATIENT
Start: 2025-01-13 | End: 2025-01-13 | Stop reason: SDUPTHER

## 2025-01-13 RX ORDER — BUPIVACAINE HYDROCHLORIDE 2.5 MG/ML
INJECTION, SOLUTION INFILTRATION; PERINEURAL
Status: COMPLETED | OUTPATIENT
Start: 2025-01-13 | End: 2025-01-13

## 2025-01-13 RX ORDER — LIDOCAINE HYDROCHLORIDE 10 MG/ML
INJECTION, SOLUTION EPIDURAL; INFILTRATION; INTRACAUDAL; PERINEURAL
Status: DISCONTINUED | OUTPATIENT
Start: 2025-01-13 | End: 2025-01-13 | Stop reason: SDUPTHER

## 2025-01-13 RX ORDER — SODIUM CHLORIDE 0.9 % (FLUSH) 0.9 %
5-40 SYRINGE (ML) INJECTION PRN
Status: DISCONTINUED | OUTPATIENT
Start: 2025-01-13 | End: 2025-01-13 | Stop reason: HOSPADM

## 2025-01-13 RX ORDER — OXYCODONE AND ACETAMINOPHEN 5; 325 MG/1; MG/1
1-2 TABLET ORAL EVERY 6 HOURS PRN
Qty: 30 TABLET | Refills: 0 | Status: SHIPPED | OUTPATIENT
Start: 2025-01-13 | End: 2025-01-16 | Stop reason: HOSPADM

## 2025-01-13 RX ORDER — ONDANSETRON 2 MG/ML
INJECTION INTRAMUSCULAR; INTRAVENOUS
Status: DISCONTINUED | OUTPATIENT
Start: 2025-01-13 | End: 2025-01-13 | Stop reason: SDUPTHER

## 2025-01-13 RX ORDER — KETOROLAC TROMETHAMINE 15 MG/ML
15 INJECTION, SOLUTION INTRAMUSCULAR; INTRAVENOUS ONCE
Status: COMPLETED | OUTPATIENT
Start: 2025-01-13 | End: 2025-01-13

## 2025-01-13 RX ORDER — LIDOCAINE HYDROCHLORIDE 10 MG/ML
1 INJECTION, SOLUTION EPIDURAL; INFILTRATION; INTRACAUDAL; PERINEURAL
Status: DISCONTINUED | OUTPATIENT
Start: 2025-01-13 | End: 2025-01-13 | Stop reason: HOSPADM

## 2025-01-13 RX ORDER — HYDRALAZINE HYDROCHLORIDE 20 MG/ML
10 INJECTION INTRAMUSCULAR; INTRAVENOUS
Status: DISCONTINUED | OUTPATIENT
Start: 2025-01-13 | End: 2025-01-13 | Stop reason: HOSPADM

## 2025-01-13 RX ORDER — NALOXONE HYDROCHLORIDE 0.4 MG/ML
INJECTION, SOLUTION INTRAMUSCULAR; INTRAVENOUS; SUBCUTANEOUS PRN
Status: DISCONTINUED | OUTPATIENT
Start: 2025-01-13 | End: 2025-01-13 | Stop reason: HOSPADM

## 2025-01-13 RX ORDER — MEPERIDINE HYDROCHLORIDE 50 MG/ML
12.5 INJECTION INTRAMUSCULAR; INTRAVENOUS; SUBCUTANEOUS ONCE
Status: DISCONTINUED | OUTPATIENT
Start: 2025-01-13 | End: 2025-01-13 | Stop reason: HOSPADM

## 2025-01-13 RX ORDER — MORPHINE SULFATE 2 MG/ML
1 INJECTION, SOLUTION INTRAMUSCULAR; INTRAVENOUS EVERY 5 MIN PRN
Status: DISCONTINUED | OUTPATIENT
Start: 2025-01-13 | End: 2025-01-13 | Stop reason: HOSPADM

## 2025-01-13 RX ORDER — BUPIVACAINE HYDROCHLORIDE 5 MG/ML
INJECTION, SOLUTION EPIDURAL; INTRACAUDAL
Status: COMPLETED | OUTPATIENT
Start: 2025-01-13 | End: 2025-01-13

## 2025-01-13 RX ORDER — SODIUM CHLORIDE, SODIUM LACTATE, POTASSIUM CHLORIDE, CALCIUM CHLORIDE 600; 310; 30; 20 MG/100ML; MG/100ML; MG/100ML; MG/100ML
INJECTION, SOLUTION INTRAVENOUS CONTINUOUS
Status: DISCONTINUED | OUTPATIENT
Start: 2025-01-13 | End: 2025-01-13 | Stop reason: HOSPADM

## 2025-01-13 RX ORDER — PHENYLEPHRINE HCL IN 0.9% NACL 1 MG/10 ML
SYRINGE (ML) INTRAVENOUS
Status: DISCONTINUED | OUTPATIENT
Start: 2025-01-13 | End: 2025-01-13 | Stop reason: SDUPTHER

## 2025-01-13 RX ORDER — MIDAZOLAM HYDROCHLORIDE 1 MG/ML
INJECTION, SOLUTION INTRAMUSCULAR; INTRAVENOUS
Status: COMPLETED
Start: 2025-01-13 | End: 2025-01-13

## 2025-01-13 RX ORDER — BUPIVACAINE HYDROCHLORIDE 2.5 MG/ML
INJECTION, SOLUTION EPIDURAL; INFILTRATION; INTRACAUDAL
Status: COMPLETED | OUTPATIENT
Start: 2025-01-13 | End: 2025-01-13

## 2025-01-13 RX ORDER — OXYCODONE HYDROCHLORIDE 5 MG/1
5 TABLET ORAL PRN
Status: DISCONTINUED | OUTPATIENT
Start: 2025-01-13 | End: 2025-01-13 | Stop reason: HOSPADM

## 2025-01-13 RX ORDER — FENTANYL CITRATE 50 UG/ML
INJECTION, SOLUTION INTRAMUSCULAR; INTRAVENOUS
Status: COMPLETED | OUTPATIENT
Start: 2025-01-13 | End: 2025-01-13

## 2025-01-13 RX ORDER — LABETALOL HYDROCHLORIDE 5 MG/ML
10 INJECTION, SOLUTION INTRAVENOUS
Status: DISCONTINUED | OUTPATIENT
Start: 2025-01-13 | End: 2025-01-13 | Stop reason: HOSPADM

## 2025-01-13 RX ORDER — ASPIRIN 81 MG/1
81 TABLET ORAL 2 TIMES DAILY
Qty: 60 TABLET | Refills: 0 | Status: SHIPPED | OUTPATIENT
Start: 2025-01-13 | End: 2025-02-12

## 2025-01-13 RX ADMIN — GLYCOPYRROLATE 0.2 MG: 0.2 INJECTION INTRAMUSCULAR; INTRAVENOUS at 16:10

## 2025-01-13 RX ADMIN — FENTANYL CITRATE 100 MCG: 50 INJECTION, SOLUTION INTRAMUSCULAR; INTRAVENOUS at 14:27

## 2025-01-13 RX ADMIN — Medication 100 MCG: at 16:00

## 2025-01-13 RX ADMIN — Medication 2000 MG: at 15:58

## 2025-01-13 RX ADMIN — BUPIVACAINE HYDROCHLORIDE 13 ML: 5 INJECTION, SOLUTION EPIDURAL; INTRACAUDAL; PERINEURAL at 14:27

## 2025-01-13 RX ADMIN — PROPOFOL 150 MG: 10 INJECTION, EMULSION INTRAVENOUS at 15:43

## 2025-01-13 RX ADMIN — FENTANYL CITRATE 50 MCG: 50 INJECTION, SOLUTION INTRAMUSCULAR; INTRAVENOUS at 15:43

## 2025-01-13 RX ADMIN — TIMOLOL MALEATE 1 DROP: 2.5 SOLUTION/ DROPS OPHTHALMIC at 08:47

## 2025-01-13 RX ADMIN — HYDROMORPHONE HYDROCHLORIDE 0.5 MG: 1 INJECTION, SOLUTION INTRAMUSCULAR; INTRAVENOUS; SUBCUTANEOUS at 05:27

## 2025-01-13 RX ADMIN — TIMOLOL MALEATE 1 DROP: 2.5 SOLUTION/ DROPS OPHTHALMIC at 21:48

## 2025-01-13 RX ADMIN — Medication 200 MCG: at 15:52

## 2025-01-13 RX ADMIN — LIDOCAINE HYDROCHLORIDE 50 MG: 10 INJECTION, SOLUTION EPIDURAL; INFILTRATION; INTRACAUDAL; PERINEURAL at 15:43

## 2025-01-13 RX ADMIN — BISACODYL 5 MG: 5 TABLET, COATED ORAL at 22:38

## 2025-01-13 RX ADMIN — MIDAZOLAM 2 MG: 1 INJECTION INTRAMUSCULAR; INTRAVENOUS at 14:27

## 2025-01-13 RX ADMIN — GLYCOPYRROLATE 0.2 MG: 0.2 INJECTION INTRAMUSCULAR; INTRAVENOUS at 15:58

## 2025-01-13 RX ADMIN — FENTANYL CITRATE 100 MCG: 50 INJECTION, SOLUTION INTRAMUSCULAR; INTRAVENOUS at 14:23

## 2025-01-13 RX ADMIN — MIDAZOLAM HYDROCHLORIDE 2 MG: 1 INJECTION, SOLUTION INTRAMUSCULAR; INTRAVENOUS at 14:23

## 2025-01-13 RX ADMIN — HYDROMORPHONE HYDROCHLORIDE 0.5 MG: 1 INJECTION, SOLUTION INTRAMUSCULAR; INTRAVENOUS; SUBCUTANEOUS at 17:27

## 2025-01-13 RX ADMIN — FENTANYL CITRATE 50 MCG: 50 INJECTION, SOLUTION INTRAMUSCULAR; INTRAVENOUS at 16:20

## 2025-01-13 RX ADMIN — SODIUM CHLORIDE, PRESERVATIVE FREE 10 ML: 5 INJECTION INTRAVENOUS at 08:47

## 2025-01-13 RX ADMIN — BUPIVACAINE HYDROCHLORIDE 10 ML: 2.5 INJECTION, SOLUTION EPIDURAL; INFILTRATION; INTRACAUDAL; PERINEURAL at 14:34

## 2025-01-13 RX ADMIN — HYDROCODONE BITARTRATE AND ACETAMINOPHEN 2 TABLET: 5; 325 TABLET ORAL at 22:38

## 2025-01-13 RX ADMIN — Medication 200 MCG: at 16:10

## 2025-01-13 RX ADMIN — ONDANSETRON 4 MG: 2 INJECTION INTRAMUSCULAR; INTRAVENOUS at 16:20

## 2025-01-13 RX ADMIN — HYDROCODONE BITARTRATE AND ACETAMINOPHEN 2 TABLET: 5; 325 TABLET ORAL at 10:11

## 2025-01-13 RX ADMIN — KETOROLAC TROMETHAMINE 15 MG: 15 INJECTION, SOLUTION INTRAMUSCULAR; INTRAVENOUS at 17:54

## 2025-01-13 RX ADMIN — SODIUM CHLORIDE, PRESERVATIVE FREE 10 ML: 5 INJECTION INTRAVENOUS at 22:25

## 2025-01-13 RX ADMIN — HYDROMORPHONE HYDROCHLORIDE 0.5 MG: 1 INJECTION, SOLUTION INTRAMUSCULAR; INTRAVENOUS; SUBCUTANEOUS at 17:42

## 2025-01-13 RX ADMIN — SODIUM CHLORIDE, POTASSIUM CHLORIDE, SODIUM LACTATE AND CALCIUM CHLORIDE: 600; 310; 30; 20 INJECTION, SOLUTION INTRAVENOUS at 15:39

## 2025-01-13 RX ADMIN — BUPIVACAINE HYDROCHLORIDE 13 ML: 2.5 INJECTION, SOLUTION INFILTRATION; PERINEURAL at 14:27

## 2025-01-13 ASSESSMENT — PAIN DESCRIPTION - ORIENTATION
ORIENTATION: LEFT

## 2025-01-13 ASSESSMENT — PAIN SCALES - GENERAL
PAINLEVEL_OUTOF10: 7
PAINLEVEL_OUTOF10: 10
PAINLEVEL_OUTOF10: 4
PAINLEVEL_OUTOF10: 10
PAINLEVEL_OUTOF10: 4
PAINLEVEL_OUTOF10: 7
PAINLEVEL_OUTOF10: 0
PAINLEVEL_OUTOF10: 6
PAINLEVEL_OUTOF10: 8
PAINLEVEL_OUTOF10: 6
PAINLEVEL_OUTOF10: 8
PAINLEVEL_OUTOF10: 10

## 2025-01-13 ASSESSMENT — PAIN DESCRIPTION - LOCATION
LOCATION: ANKLE

## 2025-01-13 ASSESSMENT — PAIN DESCRIPTION - PAIN TYPE
TYPE: SURGICAL PAIN

## 2025-01-13 ASSESSMENT — PAIN DESCRIPTION - DESCRIPTORS
DESCRIPTORS: ACHING
DESCRIPTORS: SORE;STABBING
DESCRIPTORS: STABBING
DESCRIPTORS: THROBBING
DESCRIPTORS: ACHING
DESCRIPTORS: STABBING
DESCRIPTORS: STABBING
DESCRIPTORS: ACHING
DESCRIPTORS: STABBING
DESCRIPTORS: ACHING
DESCRIPTORS: STABBING
DESCRIPTORS: ACHING

## 2025-01-13 ASSESSMENT — PAIN - FUNCTIONAL ASSESSMENT
PAIN_FUNCTIONAL_ASSESSMENT: 0-10
PAIN_FUNCTIONAL_ASSESSMENT: ACTIVITIES ARE NOT PREVENTED

## 2025-01-13 NOTE — ANESTHESIA PRE PROCEDURE
98.2 °F (36.8 °C)    TempSrc:   Oral    SpO2:   97%    Weight:       Height:                                                  BP Readings from Last 3 Encounters:   01/13/25 135/70   02/19/23 (!) 122/92   02/15/23 135/75       NPO Status:                                                                                 BMI:   Wt Readings from Last 3 Encounters:   01/11/25 79.9 kg (176 lb 2.4 oz)   02/19/23 76.7 kg (169 lb 1.5 oz)   02/13/23 76.2 kg (168 lb)     Body mass index is 27.45 kg/m².    CBC:   Lab Results   Component Value Date/Time    WBC 8.0 01/13/2025 07:42 AM    RBC 3.97 01/13/2025 07:42 AM    HGB 12.5 01/13/2025 07:42 AM    HCT 37.3 01/13/2025 07:42 AM    MCV 94.1 01/13/2025 07:42 AM    RDW 13.5 01/13/2025 07:42 AM     01/13/2025 07:42 AM       CMP:   Lab Results   Component Value Date/Time     01/13/2025 07:42 AM    K 3.6 01/13/2025 07:42 AM     01/13/2025 07:42 AM    CO2 28 01/13/2025 07:42 AM    BUN 8 01/13/2025 07:42 AM    CREATININE 0.7 01/13/2025 07:42 AM    LABGLOM 88 01/13/2025 07:42 AM    LABGLOM >60 02/18/2023 04:36 AM    GLUCOSE 101 01/13/2025 07:42 AM    CALCIUM 8.9 01/13/2025 07:42 AM    BILITOT 0.9 02/17/2023 05:39 AM    ALKPHOS 90 02/17/2023 05:39 AM    AST 61 02/17/2023 05:39 AM    ALT 48 02/17/2023 05:39 AM       POC Tests: No results for input(s): \"POCGLU\", \"POCNA\", \"POCK\", \"POCCL\", \"POCBUN\", \"POCHEMO\", \"POCHCT\" in the last 72 hours.    Coags:   Lab Results   Component Value Date/Time    PROTIME 10.4 02/17/2023 05:39 AM    INR 1.0 02/17/2023 05:39 AM    APTT 24.6 02/17/2023 05:39 AM       HCG (If Applicable): No results found for: \"PREGTESTUR\", \"PREGSERUM\", \"HCG\", \"HCGQUANT\"     ABGs: No results found for: \"PHART\", \"PO2ART\", \"IDM5MWJ\", \"YMI2FFF\", \"BEART\", \"I3PYPBQQ\"     Type & Screen (If Applicable):  No results found for: \"ABORH\", \"LABANTI\"    Drug/Infectious Status (If Applicable):  No results found for: \"HIV\", \"HEPCAB\"    COVID-19 Screening (If Applicable): No

## 2025-01-13 NOTE — ANESTHESIA POSTPROCEDURE EVALUATION
Department of Anesthesiology  Postprocedure Note    Patient: Bibiana Garces  MRN: [id number]  YOB: 1945  Date of evaluation: 1/13/2025    Procedure Summary       Date: 01/13/25 Room / Location: [address]    Anesthesia Start: 1539 Anesthesia Stop: 1719    Procedure: LEFT ANKLE OPEN REDUCTION INTERNAL FIXATION WITH SYNTHES (Left: Ankle) Diagnosis:       Closed fracture of left ankle, initial encounter      (Closed fracture of left ankle, initial encounter [S82.892A])    Surgeons: Dar Rivera MD Responsible Provider: Anais Padilla MD    Anesthesia Type: general ASA Status: 3 - Emergent            Anesthesia Type: No value filed.    Shelbie Phase I: Shelbie Score: 8    Shelbie Phase II:      Anesthesia Post Evaluation    Patient location during evaluation: PACU  Patient participation: complete - patient participated  Level of consciousness: awake and alert  Airway patency: patent  Nausea & Vomiting: no nausea and no vomiting  Cardiovascular status: blood pressure returned to baseline  Respiratory status: acceptable and room air  Hydration status: euvolemic  Pain management: adequate and satisfactory to patient    No notable events documented.

## 2025-01-13 NOTE — ANESTHESIA PROCEDURE NOTES
Peripheral Block    Patient location during procedure: pre-op  Reason for block: post-op pain management and at surgeon's request  Start time: 1/13/2025 2:34 PM  End time: 1/13/2025 2:35 PM  Staffing  Performed: anesthesiologist   Anesthesiologist: Anais Padilla MD  Performed by: Anais Padilla MD  Authorized by: Anais Padilla MD    Preanesthetic Checklist  Completed: patient identified, IV checked, site marked, risks and benefits discussed, surgical/procedural consents, equipment checked, pre-op evaluation, timeout performed, anesthesia consent given, oxygen available, monitors applied/VS acknowledged, fire risk safety assessment completed and verbalized and blood product R/B/A discussed and consented  Peripheral Block   Patient position: supine  Prep: ChloraPrep  Provider prep: mask and sterile gloves  Patient monitoring: cardiac monitor, continuous pulse ox, frequent blood pressure checks, IV access, oxygen and responsive to questions  Block type: Femoral  Adductor canal  Laterality: left  Injection technique: single-shot  Guidance: ultrasound guided  Local infiltration: bupivacaine  Infiltration strength: 0.25 %  Local infiltration: bupivacaine  Dose: 2 mL    Needle   Needle type: insulated echogenic nerve stimulator needle   Needle gauge: 20 G  Needle localization: ultrasound guidance  Needle insertion depth: 4 cm  Test dose: negative  Needle length: 10 cm  Assessment   Injection assessment: negative aspiration for heme, no paresthesia on injection, local visualized surrounding nerve on ultrasound and no intravascular symptoms  Paresthesia pain: none  Slow fractionated injection: yes  Hemodynamics: stable  Outcomes: uncomplicated and patient tolerated procedure well    Medications Administered  BUPivacaine (MARCAINE) PF injection 0.25% - Perineural   10 mL - 1/13/2025 2:34:00 PM

## 2025-01-13 NOTE — PROGRESS NOTES
[facility]  Office: [phone]  Jose Hernandez DO, Jeancarlos Ray DO, German Turcios DO, Sunny Doty DO, Oneil Gan MD, Carmen Greene MD, Julianne Marcum MD, Anais Neri MD,  Prince Srinivasan MD, Adriano Wall MD, Alexandr Choi MD,  Héctor Jones DO, Rosalie Dick MD, Deshaun Hopkins MD, Jeronimo Hernandez DO, Emmanuelle Watkins MD,  Benton Rascon DO, Sandra Del Toro MD, Marilee Prater MD, Layla Sosa MD, Alvin Mittal MD,  Yamil Bower MD, Tello Winter MD, Anne Snell MD, Mohsen Chan MD, Anderson Sanders MD, Marianne Washburn MD, Butch London DO, Kvng Dupont MD, Héctor Hernandez MD, Mohsin Reza, MD, Shirley Waterhouse, CNP,  Ceci Mancilla CNP, Butch Renee, RUSSEL,  Lela Christian, DELORES, Sabine Johnson, CNP, Kenna Owens, CNP, Tran Jenkins CNP, Juliana Faust CNP, Marybel Gonzalez, PA-C, Kaleigh Tristan PA-C, Soumya Ortez, CNP, Damaris Eagle, CNP,  Mabel Ulrich, CNP, Kathe Mccarty, CNP, Jo Kenyon, CNP,  Marycarmen Ross CNP, Chikis Bell, CNP         [facility]   IN-PATIENT SERVICE   [facility]    Progress Note    1/13/2025    9:59 AM    Name:   Bibiana Garces  MRN:     [id number]     Acct:      [id number]   Room:   [address] [place] [address] Day:  3  Admit Date:  1/10/2025  5:57 PM    PCP:   Marva Reynolds DO  Code Status:  Full Code    Subjective:     C/C:   Chief Complaint   Patient presents with    Ankle Pain     Interval History Status: not changed.     Patient seen and examined at bedside this morning.  Overnight, patient did have some urinary retention and Beltre catheter was placed. Pain is fairly well-controlled.  Plan is scheduled to go to the OR with Ortho surgery at 3 PM.    Brief History:     Bibiana Garces is a 79 y.o. female who presents with Ankle Pain and is admitted to the hospital for the management of Trimalleolar fracture of ankle, closed, left, initial encounter. Imaging showed marked malalignment.  She was reduced and splinted in the  emergency room.  Orthopedic surgery consulted in the ED, recommended operative intervention.  Plan for surgery 1/13.  Continue pain control    Review of Systems:     Constitutional:  negative for chills, fevers, sweats  Respiratory:  negative for cough, dyspnea on exertion, shortness of breath, wheezing  Cardiovascular:  negative for chest pain, chest pressure/discomfort, lower extremity edema, palpitations  Gastrointestinal:  negative for abdominal pain, constipation, diarrhea, nausea, vomiting  Neurological:  negative for dizziness, headache  MSK: left ankle pain, no numbness or tingling  : Beltre catheter in place    Medications:     Allergies:    Allergies   Allergen Reactions    Antihistamines, Diphenhydramine-Type Other (See Comments)     Bp drops and I pass out. If given in large amount    Biaxin [Clarithromycin] Hives     Abdominal pain    Ceclor [Cefaclor]      Hives and abdominal pain    Ceftin [Cefuroxime] Other (See Comments)     Hives and abdominal pain    Cleocin [Clindamycin] Other (See Comments)     Hives and abdominal pain    Erythromycin      Hives and abdominal pain    Naprelan [Naproxen] Hives    Prednisone Other (See Comments)     Hives and abdominal pain    Relafen [Nabumetone] Other (See Comments)     Hives and abdominal pain    Suprax [Cefixime] Other (See Comments)     Hives and abdominal pain    Zocor [Simvastatin] Other (See Comments)     Abdominal pain       Current Meds:   Scheduled Meds:    sodium chloride flush  5-40 mL IntraVENous 2 times per day    enoxaparin  40 mg SubCUTAneous Daily    pantoprazole  40 mg Oral QAM AC    timolol  1 drop Both Eyes BID    Latanoprostene Bunod  1 drop Ophthalmic Nightly     Continuous Infusions:    sodium chloride       PRN Meds: bisacodyl, HYDROcodone 5 mg - acetaminophen **OR** HYDROcodone 5 mg - acetaminophen, HYDROmorphone, sodium chloride flush, sodium chloride, potassium chloride **OR** potassium alternative oral replacement **OR** potassium

## 2025-01-13 NOTE — ANESTHESIA PROCEDURE NOTES
Peripheral Block    Patient location during procedure: pre-op  Reason for block: post-op pain management and at surgeon's request  Start time: 1/13/2025 2:27 PM  End time: 1/13/2025 2:30 PM  Staffing  Performed: anesthesiologist   Anesthesiologist: Anais Padilla MD  Performed by: Anais Padilla MD  Authorized by: Anais Padilla MD    Preanesthetic Checklist  Completed: patient identified, IV checked, site marked, risks and benefits discussed, surgical/procedural consents, equipment checked, pre-op evaluation, timeout performed, anesthesia consent given, oxygen available, monitors applied/VS acknowledged, fire risk safety assessment completed and verbalized and blood product R/B/A discussed and consented  Peripheral Block   Patient position: right lateral decubitus  Prep: ChloraPrep  Provider prep: mask and sterile gloves  Patient monitoring: cardiac monitor, continuous pulse ox, frequent blood pressure checks, IV access, oxygen and responsive to questions  Block type: Sciatic  Popliteal  Laterality: left  Injection technique: single-shot  Guidance: ultrasound guided  Local infiltration: bupivacaine  Infiltration strength: 0.25 %  Local infiltration: bupivacaine  Dose: 2 mL    Needle   Needle type: insulated echogenic nerve stimulator needle   Needle gauge: 20 G  Needle localization: anatomical landmarks and ultrasound guidance  Needle insertion depth: 5 cm  Test dose: negative  Needle length: 10 cm  Assessment   Injection assessment: negative aspiration for heme, no paresthesia on injection, local visualized surrounding nerve on ultrasound and no intravascular symptoms  Paresthesia pain: none  Slow fractionated injection: yes  Hemodynamics: stable  Outcomes: uncomplicated and patient tolerated procedure well    Additional Notes  0.375% Bupivacaine 26 ml  Medications Administered  fentaNYL (SUBLIMAZE) injection - IntraVENous   100 mcg - 1/13/2025 2:27:00 PM  midazolam (VERSED) injection 2 mg/2mL - IntraVENous   2 mg - 1/13/2025

## 2025-01-13 NOTE — PROGRESS NOTES
Physical Therapy  Facility/Department: [address]   Physical Therapy Daily Treatment Note    Patient Name: Bibiana Garces        MRN: [id number]    : 1945    Date of Service: 2025    Chief Complaint   Patient presents with    Ankle Pain     Past Medical History:  has a past medical history of Acute urinary retention, Arrhythmia, Arthritis, Bursitis of left hip, Chronic back pain, COVID-19, Glaucoma, Hiatal hernia, Hyperlipidemia, Insomnia, Poor historian, Small bowel obstruction (HCC), TIA (transient ischemic attack), Under care of team, Under care of team, Vasovagal near syncope, Vitamin D deficiency, and Wellness examination.  Past Surgical History:  has a past surgical history that includes Hysterectomy (); Small intestine surgery; Cholecystectomy; Appendectomy; Tonsillectomy; eye surgery; Foot surgery (Right); Colonoscopy; Endoscopy, colon, diagnostic; Knee cartilage surgery (Right); Cystoscopy (N/A, 02/15/2023); and Cystoscopy (N/A, 2/15/2023).    Discharge Recommendations  Discharge Recommendations: Therapy recommended at discharge  PT Equipment Recommendations  Equipment Needed: Yes (TBD)    Assessment  Body Structures, Functions, Activity Limitations Requiring Skilled Therapeutic Intervention: Decreased functional mobility ;Decreased safe awareness;Decreased body mechanics;Decreased balance;Increased pain  Assessment: The patient was admitted after falling at home and sustaining a L ankle trimalleolar fracture. Her ankle is currently splinted and she is scheduled for surgery on 2025 with ortho. She is currently NWB on LLE. The patient is very independent at baseline, as she completes all ADLs independently and ambulates without an AD. This is the only fall that the patient has sustained in the past year. Currently, the patient was able to perform bed mobility with Williams at most.She was able to stand for 2-3 minutes x 2 with RW and ModAx 2 to stand then Williams x 2 once in standing to maintain

## 2025-01-13 NOTE — PLAN OF CARE
Problem: Discharge Planning  Goal: Discharge to home or other facility with appropriate resources  1/13/2025 1856 by April Clarke, RN  Outcome: Progressing     Problem: Skin/Tissue Integrity  Goal: Absence of new skin breakdown  Description: 1.  Monitor for areas of redness and/or skin breakdown  2.  Assess vascular access sites hourly  3.  Every 4-6 hours minimum:  Change oxygen saturation probe site  4.  Every 4-6 hours:  If on nasal continuous positive airway pressure, respiratory therapy assess nares and determine need for appliance change or resting period.  1/13/2025 1856 by April Clarke, RN  Outcome: Progressing       Problem: Safety - Adult  Goal: Free from fall injury  1/13/2025 1856 by April Clarke, RN  Outcome: Progressing        Problem: Pain  Goal: Verbalizes/displays adequate comfort level or baseline comfort level  1/13/2025 1856 by April Clarke, RN  Outcome: Progressing

## 2025-01-13 NOTE — OP NOTE
Operative Note      Patient: Bibiana Garces  YOB: 1945  MRN: [id number]    Date of Procedure: 1/13/2025    Pre-Op Diagnosis Codes:      * Closed fracture of left ankle, initial encounter [S82.892A]    Post-Op Diagnosis:  Left ankle bimalleolar fracture       Procedure(s):  LEFT ANKLE OPEN REDUCTION INTERNAL FIXATION WITH SYNTHES, bimalleolar fracture    Surgeon(s):  Dar Rivera MD    Assistant:   Physician Assistant: Irena Zhang PA-C    Anesthesia: General    Estimated Blood Loss (mL): Minimal    Complications: None    Specimens:   * No specimens in log *    Implants:  Implant Name Type Inv. Item Serial No.  Lot No. LRB No. Used Action   SCREW BNE L12MM DIA2.7MM KARINA S [address] [facility] [place] FULL THRD T8 - [id number]  SCREW BNE L12MM DIA2.7MM KARINA S STL [facility] [place] FULL THRD T8  DEPUY 1-[employer] [place]  Left 1 Implanted   SCREW BNE L16MM DIA2.7MM KARINA S STL [facility] [place] FULL THRD T8 - [id number]  SCREW BNE L16MM DIA2.7MM KARINA S STL [facility] [place] FULL THRD T8  DEPUY SYNTHES [place]  Left 2 Implanted   SCREW BNE L18MM DIA2.7MM KARINA S STL [facility] [place] FULL THRD T8 - [id number]  SCREW BNE L18MM DIA2.7MM KARINA S STL [facility] [place] FULL THRD T8  DEPUY SYNTHES [place]  Left 1 Implanted   SCREW BNE L14MM DIA3.5MM KARINA S STL ST NONCANNULATED - [id number]  SCREW BNE L14MM DIA3.5MM KARINA S STL ST NONCANNULATED  DEPUY SYNTHES [place]  Left 2 Implanted   SCREW BNE L16MM DIA3.5MM KARINA S STL ST FULL THRD HEX DRV - [id number]  SCREW BNE L16MM DIA3.5MM KARINA S STL ST FULL THRD HEX DRV  DEPUY SYNTHES [place]  Left 1 Implanted   SCREW BNE L26MM [id number] KARINA S STL ST HEX DRV RECESS LO PROF - [id number]  SCREW BNE L26MM [id number] KARINA S STL ST HEX DRV RECESS LO PROF  DEPUY SYNTHES [place]  Left 1 Implanted   SCREW BNE L36MM [id number] KARINA S STL ST FULL THRD HEX DRV LO - [id number]  SCREW BNE L36MM [id number] KARINA S STL ST FULL THRD HEX DRV LO  [employer] [place] SYNTHES [place]  Left 1 Implanted   PLATE BNE L86MM 4 H L DST LAT FIBULAR S STL ELADIO COMPR FOR - [id number]  PLATE

## 2025-01-13 NOTE — PROGRESS NOTES
Occupational Therapy Daily Treatment Note  Facility/Department: [address]   Patient Name: Bibiana Garces        MRN: [id number]    : 1945    Date of Service: 2025    Chief Complaint   Patient presents with    Ankle Pain     Past Medical History:  has a past medical history of Acute urinary retention, Arrhythmia, Arthritis, Bursitis of left hip, Chronic back pain, COVID-19, Glaucoma, Hiatal hernia, Hyperlipidemia, Insomnia, Poor historian, Small bowel obstruction (HCC), TIA (transient ischemic attack), Under care of team, Under care of team, Vasovagal near syncope, Vitamin D deficiency, and Wellness examination.  Past Surgical History:  has a past surgical history that includes Hysterectomy (); Small intestine surgery; Cholecystectomy; Appendectomy; Tonsillectomy; eye surgery; Foot surgery (Right); Colonoscopy; Endoscopy, colon, diagnostic; Knee cartilage surgery (Right); Cystoscopy (N/A, 02/15/2023); and Cystoscopy (N/A, 2/15/2023).    Discharge Recommendations  Discharge Recommendations: Patient would benefit from continued therapy after discharge  OT Equipment Recommendations  Other: continue to assess pending progress    Assessment  Performance deficits / Impairments: Decreased functional mobility ;Decreased ADL status;Decreased balance;Decreased safe awareness  Assessment: Pt seen for OT tx this date. Pt demo'd increased independent with LBD and functional transfers this date, demonstrating ability to complete two stands at RW with MOD x2 for sit<>stand. Pt continues to be NWB to LLE with sx planned for this afternoon. Pt does not currently demonstrate the safe functional abilities to return home due to continued level of assist required for bed mobility, transfers, functional mobility, and ADLs. Continued OT services are warranted while pt is hospitalized and upon d/c to maximize IND and safety with functional tasks and educate/implement compensatory techniques due to current NWB  status.  Prognosis: Good  REQUIRES OT FOLLOW-UP: Yes  Activity Tolerance  Activity Tolerance: Patient Tolerated treatment well;Patient limited by pain  Safety Devices  Type of Devices: Nurse notified;Bed alarm in place;Gait belt;Patient at risk for falls;Left in bed;Call light within reach  Restraints  Restraints Initially in Place: No    AM-PAC  AM-[facility] Daily Activity - Inpatient   How much help is needed for putting on and taking off regular lower body clothing?: A Lot  How much help is needed for bathing (which includes washing, rinsing, drying)?: A Lot  How much help is needed for toileting (which includes using toilet, bedpan, or urinal)?: A Lot  How much help is needed for putting on and taking off regular upper body clothing?: A Little  How much help is needed for taking care of personal grooming?: A Little  How much help for eating meals?: None  AM-[facility] Inpatient Daily Activity Raw Score: 16  AM-PAC Inpatient ADL T-Scale Score : 35.96  ADL Inpatient CMS 0-100% Score: 53.32  ADL Inpatient CMS G-Code Modifier : CK    Restrictions/Precautions  Restrictions/Precautions  Restrictions/Precautions: Weight Bearing;Fall Risk  Activity Level: Up with Assist  Lower Extremity Weight Bearing Restrictions  Left Lower Extremity Weight Bearing: Non Weight Bearing  Position Activity Restriction  Other Position/Activity Restrictions: L ankle trimalleolar fracture s/p reduction, scheduled for surgery on 1/13/2025: NWB on LLE at this time    Subjective  General  Patient assessed for rehabilitation services?: Yes  Family / Caregiver Present: No  General Comment  Comments: OK for OT/PT co-tx. Pt agreeble and cooperative throughout,  Pain  Pre-Pain: 7  Post-Pain: 7  Pain Location: Ankle;Left (catheter site)  Pain Interventions: Repositioning;Other (Comment) (elevation of LLE, standing and change of sitting position due to pain at catheter site at times sitting EOB)  O2 Device: None (Room air)    Objective  Orientation  Overall

## 2025-01-13 NOTE — PLAN OF CARE
Problem: Discharge Planning  Goal: Discharge to home or other facility with appropriate resources  1/12/2025 2103 by Deepa Concepcion RN  Outcome: Progressing  Flowsheets (Taken 1/12/2025 2030)  Discharge to home or other facility with appropriate resources:   Identify barriers to discharge with patient and caregiver   Arrange for needed discharge resources and transportation as appropriate   Identify discharge learning needs (meds, wound care, etc)  1/12/2025 1301 by April Clarke, RN  Outcome: Progressing  Flowsheets (Taken 1/12/2025 0900)  Discharge to home or other facility with appropriate resources:   Identify barriers to discharge with patient and caregiver   Arrange for needed discharge resources and transportation as appropriate   Identify discharge learning needs (meds, wound care, etc)     Problem: Skin/Tissue Integrity  Goal: Absence of new skin breakdown  Description: 1.  Monitor for areas of redness and/or skin breakdown  2.  Assess vascular access sites hourly  3.  Every 4-6 hours minimum:  Change oxygen saturation probe site  4.  Every 4-6 hours:  If on nasal continuous positive airway pressure, respiratory therapy assess nares and determine need for appliance change or resting period.  1/12/2025 2103 by Deepa Concepcion, RN  Outcome: Progressing  1/12/2025 1301 by April Clarke, RN  Outcome: Progressing     Problem: Safety - Adult  Goal: Free from fall injury  1/12/2025 2103 by Deepa Concepcion RN  Outcome: Progressing  Flowsheets (Taken 1/12/2025 2030)  Free From Fall Injury:   Instruct family/caregiver on patient safety   Based on caregiver fall risk screen, instruct family/caregiver to ask for assistance with transferring infant if caregiver noted to have fall risk factors  1/12/2025 1301 by April Clarke, RN  Outcome: Progressing

## 2025-01-14 PROBLEM — R33.9 URINARY RETENTION: Status: ACTIVE | Noted: 2025-01-14

## 2025-01-14 LAB
EKG ATRIAL RATE: 84 BPM
EKG P AXIS: 45 DEGREES
EKG P-R INTERVAL: 168 MS
EKG Q-T INTERVAL: 388 MS
EKG QRS DURATION: 80 MS
EKG QTC CALCULATION (BAZETT): 458 MS
EKG R AXIS: -30 DEGREES
EKG T AXIS: 37 DEGREES
EKG VENTRICULAR RATE: 84 BPM
GLUCOSE BLD-MCNC: 162 MG/DL (ref 65–105)

## 2025-01-14 PROCEDURE — 97530 THERAPEUTIC ACTIVITIES: CPT

## 2025-01-14 PROCEDURE — 27814 TREATMENT OF ANKLE FRACTURE: CPT | Performed by: ORTHOPAEDIC SURGERY

## 2025-01-14 PROCEDURE — 1200000000 HC SEMI PRIVATE

## 2025-01-14 PROCEDURE — 6360000002 HC RX W HCPCS: Performed by: INTERNAL MEDICINE

## 2025-01-14 PROCEDURE — 2500000003 HC RX 250 WO HCPCS: Performed by: INTERNAL MEDICINE

## 2025-01-14 PROCEDURE — 97535 SELF CARE MNGMENT TRAINING: CPT

## 2025-01-14 PROCEDURE — 6370000000 HC RX 637 (ALT 250 FOR IP): Performed by: INTERNAL MEDICINE

## 2025-01-14 PROCEDURE — 6370000000 HC RX 637 (ALT 250 FOR IP): Performed by: STUDENT IN AN ORGANIZED HEALTH CARE EDUCATION/TRAINING PROGRAM

## 2025-01-14 PROCEDURE — 97110 THERAPEUTIC EXERCISES: CPT

## 2025-01-14 PROCEDURE — 99232 SBSQ HOSP IP/OBS MODERATE 35: CPT | Performed by: INTERNAL MEDICINE

## 2025-01-14 PROCEDURE — 27814 TREATMENT OF ANKLE FRACTURE: CPT

## 2025-01-14 RX ORDER — ESOMEPRAZOLE MAGNESIUM 40 MG/1
40 CAPSULE, DELAYED RELEASE ORAL
Status: DISCONTINUED | OUTPATIENT
Start: 2025-01-15 | End: 2025-01-16 | Stop reason: HOSPADM

## 2025-01-14 RX ORDER — BISACODYL 5 MG/1
10 TABLET, DELAYED RELEASE ORAL DAILY PRN
Status: DISCONTINUED | OUTPATIENT
Start: 2025-01-14 | End: 2025-01-16 | Stop reason: HOSPADM

## 2025-01-14 RX ORDER — POLYETHYLENE GLYCOL 3350 17 G/17G
17 POWDER, FOR SOLUTION ORAL DAILY
Status: DISCONTINUED | OUTPATIENT
Start: 2025-01-14 | End: 2025-01-16 | Stop reason: HOSPADM

## 2025-01-14 RX ADMIN — ENOXAPARIN SODIUM 40 MG: 100 INJECTION SUBCUTANEOUS at 09:05

## 2025-01-14 RX ADMIN — PANTOPRAZOLE SODIUM 40 MG: 40 TABLET, DELAYED RELEASE ORAL at 06:41

## 2025-01-14 RX ADMIN — HYDROCODONE BITARTRATE AND ACETAMINOPHEN 2 TABLET: 5; 325 TABLET ORAL at 14:00

## 2025-01-14 RX ADMIN — SODIUM CHLORIDE, PRESERVATIVE FREE 10 ML: 5 INJECTION INTRAVENOUS at 20:29

## 2025-01-14 RX ADMIN — LORAZEPAM 1 MG: 1 TABLET ORAL at 00:29

## 2025-01-14 RX ADMIN — BISACODYL 10 MG: 5 TABLET, COATED ORAL at 20:36

## 2025-01-14 RX ADMIN — SODIUM CHLORIDE, PRESERVATIVE FREE 10 ML: 5 INJECTION INTRAVENOUS at 09:05

## 2025-01-14 RX ADMIN — HYDROCODONE BITARTRATE AND ACETAMINOPHEN 2 TABLET: 5; 325 TABLET ORAL at 22:48

## 2025-01-14 RX ADMIN — POLYETHYLENE GLYCOL 3350 17 G: 17 POWDER, FOR SOLUTION ORAL at 09:14

## 2025-01-14 RX ADMIN — HYDROCODONE BITARTRATE AND ACETAMINOPHEN 1 TABLET: 5; 325 TABLET ORAL at 09:03

## 2025-01-14 RX ADMIN — HYDROMORPHONE HYDROCHLORIDE 0.5 MG: 1 INJECTION, SOLUTION INTRAMUSCULAR; INTRAVENOUS; SUBCUTANEOUS at 20:10

## 2025-01-14 RX ADMIN — TIMOLOL MALEATE 1 DROP: 2.5 SOLUTION/ DROPS OPHTHALMIC at 09:06

## 2025-01-14 ASSESSMENT — PAIN SCALES - WONG BAKER: WONGBAKER_NUMERICALRESPONSE: HURTS WHOLE LOT

## 2025-01-14 ASSESSMENT — PAIN SCALES - GENERAL
PAINLEVEL_OUTOF10: 3
PAINLEVEL_OUTOF10: 8
PAINLEVEL_OUTOF10: 7
PAINLEVEL_OUTOF10: 8
PAINLEVEL_OUTOF10: 8
PAINLEVEL_OUTOF10: 6
PAINLEVEL_OUTOF10: 3
PAINLEVEL_OUTOF10: 5
PAINLEVEL_OUTOF10: 5

## 2025-01-14 ASSESSMENT — PAIN DESCRIPTION - LOCATION
LOCATION: LEG;ANKLE
LOCATION: ANKLE;LEG
LOCATION: ANKLE

## 2025-01-14 ASSESSMENT — PAIN - FUNCTIONAL ASSESSMENT
PAIN_FUNCTIONAL_ASSESSMENT: ACTIVITIES ARE NOT PREVENTED
PAIN_FUNCTIONAL_ASSESSMENT: ACTIVITIES ARE NOT PREVENTED

## 2025-01-14 ASSESSMENT — PAIN DESCRIPTION - ORIENTATION
ORIENTATION: LEFT

## 2025-01-14 ASSESSMENT — PAIN DESCRIPTION - DESCRIPTORS
DESCRIPTORS: BURNING;ACHING;SHARP;THROBBING
DESCRIPTORS: ACHING;THROBBING
DESCRIPTORS: ACHING

## 2025-01-14 NOTE — PROGRESS NOTES
Occupational Therapy  Occupational Therapy Daily Treatment Note  Facility/Department: [address]   Patient Name: Bibiana Garces        MRN: [id number]    : 1945    Date of Service: 2025    Chief Complaint   Patient presents with    Ankle Pain     Past Medical History:  has a past medical history of Acute urinary retention, Arrhythmia, Arthritis, Bursitis of left hip, Chronic back pain, COVID-19, Glaucoma, Hiatal hernia, Hyperlipidemia, Insomnia, Poor historian, Small bowel obstruction (HCC), TIA (transient ischemic attack), Under care of team, Under care of team, Vasovagal near syncope, Vitamin D deficiency, and Wellness examination.  Past Surgical History:  has a past surgical history that includes Hysterectomy (); Small intestine surgery; Cholecystectomy; Appendectomy; Tonsillectomy; eye surgery; Foot surgery (Right); Colonoscopy; Endoscopy, colon, diagnostic; Knee cartilage surgery (Right); Cystoscopy (N/A, 02/15/2023); and Cystoscopy (N/A, 2/15/2023).    Discharge Recommendations  Discharge Recommendations: Patient would benefit from continued therapy after discharge  OT Equipment Recommendations  Other: continue to assess pending progress    Assessment  Performance deficits / Impairments: Decreased functional mobility ;Decreased ADL status;Decreased balance;Decreased safe awareness;Decreased strength;Decreased endurance    Assessment: Pt seen for OT tx this date. At this time pt is SBA bed mobility, MOD A x2 sit<>stand and SPTs with and without use of RW. Pt does not currently demonstrate the safe functional abilities to return home due to continued level of assist required for bed mobility, transfers, functional mobility, and ADLs. Continued OT services are warranted while pt is hospitalized and upon d/c to maximize IND and safety with functional tasks and educate/implement compensatory techniques due to current NWB status.    Prognosis: Good  REQUIRES OT FOLLOW-UP: Yes    Activity  tolerance/functional mobility, while PT is addressing their individualized functional mobility task.    Electronically signed by ROJAS Peters on 1/14/25 at 12:19 PM EST

## 2025-01-14 NOTE — PLAN OF CARE
Problem: Discharge Planning  Goal: Discharge to home or other facility with appropriate resources  1/14/2025 1455 by Chrissy Leo, RN  Outcome: Progressing   Pt to discharge home vs snf once medically cleared.   Problem: Skin/Tissue Integrity  Goal: Absence of new skin breakdown  Description: 1.  Monitor for areas of redness and/or skin breakdown  2.  Assess vascular access sites hourly  3.  Every 4-6 hours minimum:  Change oxygen saturation probe site  4.  Every 4-6 hours:  If on nasal continuous positive airway pressure, respiratory therapy assess nares and determine need for appliance change or resting period.  Outcome: Progressing   Monitoring.   Problem: Safety - Adult  Goal: Free from fall injury  1/14/2025 1455 by Chrissy Leo, RN  Outcome: Progressing  Flowsheets (Taken 1/14/2025 1249)  Free From Fall Injury: Instruct family/caregiver on patient safety   No injury noted this shift.   Problem: Pain  Goal: Verbalizes/displays adequate comfort level or baseline comfort level  1/14/2025 1455 by Chrissy Leo, RN  Outcome: Progressing  Flowsheets (Taken 1/14/2025 0903)  Verbalizes/displays adequate comfort level or baseline comfort level: Encourage patient to monitor pain and request assistance   Pt rated pain as high as 7 this shift. Prn norco effective for pain control.   Problem: ABCDS Injury Assessment  Goal: Absence of physical injury  Outcome: Progressing   No injury noted this shift.

## 2025-01-14 NOTE — PROGRESS NOTES
Physical Therapy  Facility/Department: [address]  Physical Therapy Daily Documentation Note    Name: Bibiana Garces  : 1945  MRN: [id number]  Date of Service: 2025    Discharge Recommendations:  Therapy recommended at discharge   PT Equipment Recommendations  Equipment Needed: Yes (TBD)  Other: currently needing w/c for mobility w/ elevating legrest L LE and removable legrests      Patient Diagnosis(es): The primary encounter diagnosis was Closed fracture of left ankle, initial encounter. A diagnosis of Post-op pain was also pertinent to this visit.  Past Medical History:  has a past medical history of Acute urinary retention, Arrhythmia, Arthritis, Bursitis of left hip, Chronic back pain, COVID-19, Glaucoma, Hiatal hernia, Hyperlipidemia, Insomnia, Poor historian, Small bowel obstruction (HCC), TIA (transient ischemic attack), Under care of team, Under care of team, Vasovagal near syncope, Vitamin D deficiency, and Wellness examination.  Past Surgical History:  has a past surgical history that includes Hysterectomy (); Small intestine surgery; Cholecystectomy; Appendectomy; Tonsillectomy; eye surgery; Foot surgery (Right); Colonoscopy; Endoscopy, colon, diagnostic; Knee cartilage surgery (Right); Cystoscopy (N/A, 02/15/2023); and Cystoscopy (N/A, 2/15/2023).    Assessment  Body Structures, Functions, Activity Limitations Requiring Skilled Therapeutic Intervention: Decreased functional mobility ;Decreased safe awareness;Decreased body mechanics;Decreased balance;Increased pain  Assessment: The patient was admitted after falling at home and sustaining a L ankle trimalleolar fracture. Her ankle is currently splinted and she is underwent ORIF L ankle w/ peripheral block on 2025 with ortho. She is currently NWB on LLE. The patient is very independent at baseline, as she completes all ADLs independently and ambulates without an AD. This is the only fall that the patient has sustained in the past year.

## 2025-01-14 NOTE — CARE COORDINATION
Cm spoke with patient she requests referral sent to [facility], referral sent. Goals is [facility], list provided she requests referral sent to [facility].- referral sent                        Post Acute Facility/Agency List     Provided patient with the following list, the list includes the overall star ratings obtained from CMS per the Medicare Web site (www.Medicare.gov):     [] Long Term Acute Care Facilities  [] Acute Inpatient Rehabilitation Facilities  [] Skilled Nursing Facilities  [] Hospice Facilities  [x] Home Care    Provided verbal instructions on how to utilize the QR Code to obtain additional detailed star ratings from www.Medicare.gov     offered to print and provide the detailed list:    []Accepted   [x]Declined                   6274 PS Inna @ [facility] regarding referral.

## 2025-01-14 NOTE — PROGRESS NOTES
Updated Dr. Rivera of the following via perfect serve:     Question about patients toes. Still continue to be numb from pad of foot to toes and patient still unable to wiggle them. Orlop this morning stated that you had given a block.     Per Dr. Rivera via telephone, ok to loosen the splint due to swelling. May be causing the patient not to be able to move toes.     1840: After loosening of splint, patient noted to be able to wiggle toes on her own. Numbness still noted, but much better.     Updated Dr. Rivera of this at 1851 via perfect serve.

## 2025-01-14 NOTE — PLAN OF CARE
Problem: Discharge Planning  Goal: Discharge to home or other facility with appropriate resources  1/14/2025 0418 by Maggi Summers LPN  Outcome: Progressing  Flowsheets (Taken 1/13/2025 2030 by Jaylon Campoverde RN)  Discharge to home or other facility with appropriate resources: Identify barriers to discharge with patient and caregiver     Problem: Safety - Adult  Goal: Free from fall injury  1/14/2025 0418 by Maggi Summers LPN  Outcome: Progressing     Problem: Pain  Goal: Verbalizes/displays adequate comfort level or baseline comfort level  1/14/2025 0418 by Maggi Summers LPN  Outcome: Progressing

## 2025-01-14 NOTE — PROGRESS NOTES
[facility]  Office: [phone]  Jose Hernandez DO, Jeancarlos Ray DO, German Turcios DO, Sunny Doty DO, Oneil Gan MD, Carmen Greene MD, Julianne Marcum MD, Anais Neri MD,  Prince Srinivasan MD, Adriano Wall MD, Alexandr Choi MD,  Héctor Jones DO, Rosalie Dick MD, Deshaun Hopkins MD, Jeronimo Hernandez DO, Emmanuelle Watkins MD,  Benton Rascon DO, Sandra Del Toro MD, Marilee Prater MD, Layla Sosa MD, Alvin Mittal MD,  Yamil Bower MD, Tello Winter MD, Anne Snell MD, Mohsen Chan MD, Anderson Sanders MD, Marianne Washburn MD, Butch London DO, Kvng Dupont MD, Héctor Hernandez MD, Mohsin Reza, MD, Shirley Waterhouse, CNP,  Ceci Mancilla CNP, Butch Renee, CNP,  Lela Christian, DNP, Sabine Johnson, CNP, Kenna Owens, CNP, Tran Jenkins, CNP, Juliana Faust, CNP, Marybel Gonzalez, PA-C, Kaleigh Tristan, PA-C, Soumya Ortez, CNP, Damaris Eagle, CNP,  Mabel Ulrich, CNP, Kathe Mccarty, CNP, Jo Kenyon, CNP,  Vicki Redd, CNS, Lazara Thornton, CNP, Marycarmen Ross, CNP,   Chikis Bell, CNP         [facility]   IN-PATIENT SERVICE   [facility]    Progress Note    1/14/2025    12:41 PM    Name:   Bibiana Garces  MRN:     [id number]     Acct:      [id number]   Room:   314/314-01   Day:  4  Admit Date:  1/10/2025  5:57 PM    PCP:   Marva Reynolds DO  Code Status:  Full Code    Subjective:     C/C:   Chief Complaint   Patient presents with    Ankle Pain     Interval History Status: improved.     Patient's postop pain is well-controlled this time, had nerve block for surgery.  Toes remain slightly numb.  Denies chest pain, shortness of breath, nausea or vomiting.  Has not had a BM since last Thursday    Brief History:     This is a 71-year-old female that presents with ankle pain after a fall at home.  She apparently had gone outside to feed a stray cat and slipped on the curb injuring her ankle.  She presented here and was found to have ankle

## 2025-01-15 PROCEDURE — 99232 SBSQ HOSP IP/OBS MODERATE 35: CPT | Performed by: INTERNAL MEDICINE

## 2025-01-15 PROCEDURE — 97535 SELF CARE MNGMENT TRAINING: CPT

## 2025-01-15 PROCEDURE — 2500000003 HC RX 250 WO HCPCS: Performed by: INTERNAL MEDICINE

## 2025-01-15 PROCEDURE — 1200000000 HC SEMI PRIVATE

## 2025-01-15 PROCEDURE — 97530 THERAPEUTIC ACTIVITIES: CPT

## 2025-01-15 PROCEDURE — 6360000002 HC RX W HCPCS: Performed by: INTERNAL MEDICINE

## 2025-01-15 PROCEDURE — 6370000000 HC RX 637 (ALT 250 FOR IP): Performed by: INTERNAL MEDICINE

## 2025-01-15 PROCEDURE — 6370000000 HC RX 637 (ALT 250 FOR IP): Performed by: STUDENT IN AN ORGANIZED HEALTH CARE EDUCATION/TRAINING PROGRAM

## 2025-01-15 PROCEDURE — 97542 WHEELCHAIR MNGMENT TRAINING: CPT

## 2025-01-15 RX ORDER — TAMSULOSIN HYDROCHLORIDE 0.4 MG/1
0.4 CAPSULE ORAL DAILY
Status: DISCONTINUED | OUTPATIENT
Start: 2025-01-15 | End: 2025-01-16 | Stop reason: HOSPADM

## 2025-01-15 RX ADMIN — LORAZEPAM 1 MG: 1 TABLET ORAL at 01:04

## 2025-01-15 RX ADMIN — ESOMEPRAZOLE MAGNESIUM 40 MG: 40 CAPSULE, DELAYED RELEASE ORAL at 05:31

## 2025-01-15 RX ADMIN — TAMSULOSIN HYDROCHLORIDE 0.4 MG: 0.4 CAPSULE ORAL at 10:58

## 2025-01-15 RX ADMIN — HYDROCODONE BITARTRATE AND ACETAMINOPHEN 1 TABLET: 5; 325 TABLET ORAL at 08:04

## 2025-01-15 RX ADMIN — NALOXEGOL OXALATE 12.5 MG: 12.5 TABLET, FILM COATED ORAL at 10:58

## 2025-01-15 RX ADMIN — HYDROCODONE BITARTRATE AND ACETAMINOPHEN 2 TABLET: 5; 325 TABLET ORAL at 03:50

## 2025-01-15 RX ADMIN — HYDROCODONE BITARTRATE AND ACETAMINOPHEN 2 TABLET: 5; 325 TABLET ORAL at 11:31

## 2025-01-15 RX ADMIN — SENNOSIDES AND DOCUSATE SODIUM 2 TABLET: 50; 8.6 TABLET ORAL at 08:13

## 2025-01-15 RX ADMIN — HYDROCODONE BITARTRATE AND ACETAMINOPHEN 2 TABLET: 5; 325 TABLET ORAL at 16:32

## 2025-01-15 RX ADMIN — ENOXAPARIN SODIUM 40 MG: 100 INJECTION SUBCUTANEOUS at 08:13

## 2025-01-15 RX ADMIN — HYDROCODONE BITARTRATE AND ACETAMINOPHEN 2 TABLET: 5; 325 TABLET ORAL at 21:03

## 2025-01-15 RX ADMIN — SODIUM CHLORIDE, PRESERVATIVE FREE 10 ML: 5 INJECTION INTRAVENOUS at 19:54

## 2025-01-15 RX ADMIN — SODIUM CHLORIDE, PRESERVATIVE FREE 20 ML: 5 INJECTION INTRAVENOUS at 08:19

## 2025-01-15 RX ADMIN — TIMOLOL MALEATE 1 DROP: 2.5 SOLUTION/ DROPS OPHTHALMIC at 08:14

## 2025-01-15 ASSESSMENT — PAIN DESCRIPTION - DESCRIPTORS
DESCRIPTORS: ACHING;STABBING
DESCRIPTORS: ACHING;STABBING
DESCRIPTORS: ACHING;BURNING;THROBBING
DESCRIPTORS: ACHING
DESCRIPTORS: ACHING;DISCOMFORT;STABBING;THROBBING

## 2025-01-15 ASSESSMENT — PAIN DESCRIPTION - ORIENTATION
ORIENTATION: LEFT

## 2025-01-15 ASSESSMENT — PAIN SCALES - WONG BAKER
WONGBAKER_NUMERICALRESPONSE: HURTS A LITTLE BIT
WONGBAKER_NUMERICALRESPONSE: HURTS LITTLE MORE
WONGBAKER_NUMERICALRESPONSE: HURTS EVEN MORE
WONGBAKER_NUMERICALRESPONSE: HURTS A LITTLE BIT
WONGBAKER_NUMERICALRESPONSE: HURTS LITTLE MORE
WONGBAKER_NUMERICALRESPONSE: HURTS A LITTLE BIT

## 2025-01-15 ASSESSMENT — PAIN SCALES - GENERAL
PAINLEVEL_OUTOF10: 4
PAINLEVEL_OUTOF10: 4
PAINLEVEL_OUTOF10: 9
PAINLEVEL_OUTOF10: 5
PAINLEVEL_OUTOF10: 5
PAINLEVEL_OUTOF10: 7
PAINLEVEL_OUTOF10: 7
PAINLEVEL_OUTOF10: 5
PAINLEVEL_OUTOF10: 7
PAINLEVEL_OUTOF10: 4
PAINLEVEL_OUTOF10: 8
PAINLEVEL_OUTOF10: 10
PAINLEVEL_OUTOF10: 3

## 2025-01-15 ASSESSMENT — PAIN DESCRIPTION - LOCATION
LOCATION: ANKLE

## 2025-01-15 NOTE — PROGRESS NOTES
Facility/Department: [address]   Physical Therapy Daily Treatment Note    Patient Name: Bibiana Garces        MRN: [id number]    : 1945    Date of Service: 1/15/2025    Chief Complaint   Patient presents with    Ankle Pain     Past Medical History:  has a past medical history of Acute urinary retention, Arrhythmia, Arthritis, Bursitis of left hip, Chronic back pain, COVID-19, Glaucoma, Hiatal hernia, Hyperlipidemia, Insomnia, Poor historian, Small bowel obstruction (HCC), TIA (transient ischemic attack), Under care of team, Under care of team, Vasovagal near syncope, Vitamin D deficiency, and Wellness examination.  Past Surgical History:  has a past surgical history that includes Hysterectomy (); Small intestine surgery; Cholecystectomy; Appendectomy; Tonsillectomy; eye surgery; Foot surgery (Right); Colonoscopy; Endoscopy, colon, diagnostic; Knee cartilage surgery (Right); Cystoscopy (N/A, 02/15/2023); Cystoscopy (N/A, 2/15/2023); and Ankle fracture surgery (Left, 2025).    Discharge Recommendations  Discharge Recommendations: Therapy recommended at discharge  PT Equipment Recommendations  Equipment Needed: Yes (TBD)  Other: currently needing w/c for mobility w/ elevating legrest L LE and removable legrests    Assessment  Body Structures, Functions, Activity Limitations Requiring Skilled Therapeutic Intervention: Decreased functional mobility ;Decreased safe awareness;Decreased body mechanics;Decreased balance;Increased pain  Assessment: The patient was admitted after falling at home and sustaining a L ankle trimalleolar fracture. Her ankle is currently splinted and she is underwent ORIF L ankle w/ peripheral block on 2025 with ortho. She is currently NWB on LLE. The patient is very independent at baseline, as she completes all ADLs independently and ambulates without an AD. This is the only fall that the patient has sustained in the past year. Pt report hx surgery on R ankle and hx pain  railing?: Total  AM-PAC Inpatient Mobility Raw Score : 10  AM-PAC Inpatient T-Scale Score : 32.29  Mobility Inpatient CMS 0-100% Score: 76.75  Mobility Inpatient CMS G-Code Modifier : CL    Restrictions/Precautions  Restrictions/Precautions  Restrictions/Precautions: Weight Bearing;Fall Risk  Activity Level: Up with Assist  Implants Present? : Metal implants (L ankle 1/13/24 ORIF)  Lower Extremity Weight Bearing Restrictions  Left Lower Extremity Weight Bearing: Non Weight Bearing  Position Activity Restriction  Other Position/Activity Restrictions: L ankle trimalleolar fracture s/p reduction, ORIF w/ stirrup splint on 1/13/2025 with Dr. Rivera: NWB on LLE    Subjective  General  Patient assessed for rehabilitation services?: Yes  Response To Previous Treatment: Patient with no complaints from previous session.  Family/Caregiver Present: No  Subjective  Subjective: RN & pt agreeable to PT. Pt supine in bed w/ HOB elevated and reports pain 3/10 L lower leg and intermittent 5/10 abdomen due to constipation. Pt positioned for comfort in recliner at end of session with cold pack L ankle.       Objective  Orientation  Overall Orientation Status: Within Functional Limits  Orientation Level: Oriented X4  Cognition  Overall Cognitive Status: Exceptions  Arousal/Alertness: Appropriate responses to stimuli  Following Commands: Appears intact  Attention Span: Appears intact  Memory: Appears intact  Safety Judgement: Decreased awareness of need for assistance;Decreased awareness of need for safety  Problem Solving: Assistance required to implement solutions;Assistance required to generate solutions;Assistance required to identify errors made;Assistance required to correct errors made;Decreased awareness of errors  Insights: Decreased awareness of deficits  Initiation: Requires cues for some  Sequencing: Requires cues for some  Cognition Comment: poor insight into current needs for assist w/ transfers and poor safety awareness

## 2025-01-15 NOTE — PROGRESS NOTES
[facility]  Office: [phone]  Jose Hernandez DO, Jeancarlos Ray DO, German Turcios DO, Sunny Doty, DO, Oneil Gan MD, Carmen Greene MD, Julianne Marcum MD, Anais Neri MD,  Prince Srinivasan MD, Adriano Wall MD, Alexandr Choi MD,  Héctor Jones DO, Rosalie Dick MD, Deshaun Hopkins MD, Jeronimo Hernandez DO, Emmanuelle Watkins MD,  Benton Rascon DO, Sandra Del Toro MD, Marilee Prater MD, Layla Sosa MD, Alvin Mittal MD,  Yamil Bower MD, Tello Winter MD, Anne Snell MD, Mohsen Chan MD, Anderson Sanders MD, Marianne Washburn MD, Butch London DO, Kvng Dupont MD, Héctor Hernandez MD, Mohsin Reza, MD, Shirley Waterhouse, CNP,  Ceci Mancilla CNP, Butch Renee, CNP,  Lela Christian, DNP, Sabine Johnson, CNP, Kenna Owens, CNP, Tran Jenkins, CNP, Juliana Faust, CNP, Marybel Gonzalez, PA-C, Kaleigh Tristan, PA-C, Soumya Ortez, CNP, Damaris Eagle, CNP,  Mabel Ulrich, CNP, Kathe Mccarty, CNP, Jo Kenyon, CNP,  Vicki Redd, CNS, Lazara Thornton, CNP, Marycarmen Ross, CNP,   Chikis Bell, CNP         [facility]   IN-PATIENT SERVICE   [facility]    Progress Note    1/15/2025    6:10 AM    Name:   Bibiana Garces  MRN:     [id number]     Acct:      [id number]   Room:   314/314-01   Day:  5  Admit Date:  1/10/2025  5:57 PM    PCP:   Marva Reynolds DO  Code Status:  Full Code    Subjective:     C/C:   Chief Complaint   Patient presents with    Ankle Pain     Interval History Status: improved.     Patient had increasing postoperative pain last evening, believes nerve block is finally worn off.  Denies any chest pain, shortness of breath, nausea or vomiting, fevers or chills.  Continues to have constipation, took 2 Dulcolax tablets last evening with no results at this time    Brief History:     This is a 71-year-old female that presents with ankle pain after a fall at home.  She apparently had gone outside to feed a stray cat and slipped on the curb  \"[id number]\", \"O2SAT\", \"FIO2\"  Lab Results   Component Value Date/Time    SPECIAL NOT REPORTED 12/27/2014 09:07 PM     No results found for: \"CULTURE\"    Radiology:  XR HIP LEFT (2-3 VIEWS)    Result Date: 1/11/2025  No acute bony findings.  Cross-sectional imaging available for further characterization, if clinical concern persists.     XR ANKLE LEFT (MIN 3 VIEWS)    Result Date: 1/10/2025  Improved alignment status post reduction.     XR ANKLE LEFT (2 VIEWS)    Result Date: 1/10/2025  Acute displaced trimalleolar fracture, with anterior ankle joint dislocation.       Physical Examination:     General appearance:  alert, cooperative and no distress  Mental Status:  oriented to person, place and time and normal affect  Lungs:  clear to auscultation bilaterally, normal effort  Heart:  regular rate and rhythm, no murmur  Abdomen:  soft, nontender, nondistended, normal bowel sounds, no masses, hepatomegaly, splenomegaly  Extremities:  no edema, redness, tenderness in the calves  Skin:  no gross lesions, rashes, induration    Assessment:     Hospital Problems             Last Modified POA    * (Principal) Trimalleolar fracture of ankle, closed, left, initial encounter 1/10/2025 Yes    Bladder cancer (HCC) (Chronic) 1/11/2025 Yes    Degeneration of intervertebral disc of lumbar region 1/11/2025 Yes    Dyslipidemia 1/11/2025 Yes    Hiatal hernia 1/11/2025 Yes    Vitamin D deficiency 1/11/2025 Yes    Closed fracture of left ankle 1/13/2025 Yes    Urinary retention 1/14/2025 Yes       Plan:     Postop care per orthopedics  Maintain Beltre catheter for now.  Patient has acute urinary retention we will plan to void trial once activity level progresses with therapy and treating constipation.  PT and OT, mobilize as able  If no BM after duplex acidemia will consider Movantik today  GI and DVT prophylaxis  Start Flomax today, anticipate void trial in the next 24 hours.  Continue home maintenance medications  Discharge planning

## 2025-01-15 NOTE — CARE COORDINATION
Rec'd call from Constantino @ [facility] they can accept, precert started.     Rec'd PS message rec'd form Inna they can accept.notified patient going to SNF.     [id number] Rec'd call from Constantino @ [facility] authorization rec'd patient can transport today if medically cleared.    1413 Cm spoke with patient updated [facility] patient is apprehensive about SNF but agreeable.

## 2025-01-15 NOTE — PLAN OF CARE
Problem: Discharge Planning  Goal: Discharge to home or other facility with appropriate resources  1/15/2025 0936 by Lanny Kuhn RN  Outcome: Progressing  1/15/2025 0355 by Maggi Summers LPN  Outcome: Progressing     Problem: Skin/Tissue Integrity  Goal: Absence of new skin breakdown  Description: 1.  Monitor for areas of redness and/or skin breakdown  2.  Assess vascular access sites hourly  3.  Every 4-6 hours minimum:  Change oxygen saturation probe site  4.  Every 4-6 hours:  If on nasal continuous positive airway pressure, respiratory therapy assess nares and determine need for appliance change or resting period.  Outcome: Progressing     Problem: Safety - Adult  Goal: Free from fall injury  1/15/2025 0936 by Lanny Kuhn RN  Outcome: Progressing  1/15/2025 0355 by Maggi Summers LPN  Outcome: Progressing     Problem: Pain  Goal: Verbalizes/displays adequate comfort level or baseline comfort level  1/15/2025 0936 by Lanny Kuhn RN  Outcome: Progressing  1/15/2025 0355 by Maggi Summers LPN  Outcome: Progressing     Problem: ABCDS Injury Assessment  Goal: Absence of physical injury  Outcome: Progressing

## 2025-01-15 NOTE — DISCHARGE INSTR - COC
Continuity of Care Form    Patient Name: Bibiana Garces   :  1945  MRN:  [id number]    Admit date:  1/10/2025  Discharge date:  2025    Code Status Order: Full Code   Advance Directives:   Advance Care Flowsheet Documentation             Admitting Physician:  Marianne Washburn MD  PCP: Marva Reynolds DO    Discharging Nurse: DEYSI Martin  Discharging Hospital Unit/Room#: 314/314-01  Discharging Unit Phone Number: [phone]    Emergency Contact:   Extended Emergency Contact Information  Primary Emergency Contact: aJbier Garces  Address: [address] [id number]  Home Phone: [phone]  Mobile Phone: [phone]  Relation: Spouse  Secondary Emergency Contact: Pauline Jung Rep  Home Phone: [phone]  Mobile Phone: [phone]  Relation: Child    Past Surgical History:  Past Surgical History:   Procedure Laterality Date    ANKLE FRACTURE SURGERY Left 2025    LEFT ANKLE OPEN REDUCTION INTERNAL FIXATION WITH SYNTHES performed by Dar Rivera MD at [facility] OR    APPENDECTOMY      CHOLECYSTECTOMY      COLONOSCOPY      CYSTOSCOPY N/A 02/15/2023    CYSTOSCOPY TUR  BLADDER TUMOR    CYSTOSCOPY N/A 2/15/2023    CYSTOSCOPY TUR  BLADDER TUMOR performed by Harinder Dodson MD at [facility] OR    ENDOSCOPY, COLON, DIAGNOSTIC      EYE SURGERY      FOOT SURGERY Right     twice    HYSTERECTOMY (CERVIX STATUS UNKNOWN)      KNEE CARTILAGE SURGERY Right     SMALL INTESTINE SURGERY      small bowel resection    TONSILLECTOMY         Immunization History:   Immunization History   Administered Date(s) Administered    COVID-19, PFIZER PURPLE top, DILUTE for use, (age 12 y+), 30mcg/0.3mL 2021, 2021       Active Problems:  Patient Active Problem List   Diagnosis Code    Acute urinary retention R33.8    Small bowel obstruction (HCC) K56.609    Extraperitoneal bladder perforation N32.89    Chemical peritonitis following a procedure T81.61XA    Trimalleolar fracture of ankle,  Amount None (dry) 01/14/25 1600   Odor None 01/14/25 1600   Debra-incision Assessment Edematous 01/14/25 1600   Number of days:         Elimination:  Continence:   Bowel: yes  Bladder: yes  Urinary Catheter: Removal Date 01/16/2025    Colostomy/Ileostomy/Ileal Conduit: No       Date of Last BM: 01/16/2025    Intake/Output Summary (Last 24 hours) at 1/15/2025 0949  Last data filed at 1/15/2025 0536  Gross per 24 hour   Intake 1000 ml   Output 2400 ml   Net -1400 ml     I/O last 3 completed shifts:  In: 1550 [P.O.:1550]  Out: 2800 [Urine:2800]    Safety Concerns:     At Risk for Falls    Impairments/Disabilities:      None    Nutrition Therapy:  Current Nutrition Therapy:   Oral: regular    Routes of Feeding: Oral  Liquids: thin liquids  Daily Fluid Restriction: no  Last Modified Barium Swallow with Video (Video Swallowing Test): not done    Treatments at the Time of Hospital Discharge:   Respiratory Treatments: NA  Oxygen Therapy:  is not on home oxygen therapy.  Ventilator:    - No ventilator support    Rehab Therapies: Physical Therapy  Weight Bearing Status/Restrictions: Non-weight bearing on left leg  Other Medical Equipment (for information only, NOT a DME order):  Dhara steady  Other Treatments:     Patient's personal belongings (please select all that are sent with patient):  None    RN SIGNATURE:  Electronically signed by Veronica Osborne LPN on 1/16/25 at 12:00 PM EST    CASE MANAGEMENT/SOCIAL WORK SECTION    Inpatient Status Date: 1/10/2025    Readmission Risk Assessment Score:  [facility] RISK OF UNPLANNED READMISSION 2.0             9.8 Total Score        Discharging to Facility/ Agency   Name: [facility]  Services: Skilled Nursing  [address] [place], [place] [address] [phone]     Facility (if applicable)  Post SNF   Name:  [facility]  Services: Home Health Services   Address: [address] [address]   Phone: [phone]       / signature:

## 2025-01-15 NOTE — PROGRESS NOTES
Occupational Therapy  Occupational Therapy Daily Treatment Note  Facility/Department: [address]   Patient Name: Bibiana Garces        MRN: [id number]    : 1945    Date of Service: 1/15/2025    Discharge Recommendations  Discharge Recommendations: Patient would benefit from continued therapy after discharge       Chief Complaint   Patient presents with    Ankle Pain     Past Medical History:  has a past medical history of Acute urinary retention, Arrhythmia, Arthritis, Bursitis of left hip, Chronic back pain, COVID-19, Glaucoma, Hiatal hernia, Hyperlipidemia, Insomnia, Poor historian, Small bowel obstruction (HCC), TIA (transient ischemic attack), Under care of team, Under care of team, Vasovagal near syncope, Vitamin D deficiency, and Wellness examination.  Past Surgical History:  has a past surgical history that includes Hysterectomy (); Small intestine surgery; Cholecystectomy; Appendectomy; Tonsillectomy; eye surgery; Foot surgery (Right); Colonoscopy; Endoscopy, colon, diagnostic; Knee cartilage surgery (Right); Cystoscopy (N/A, 02/15/2023); Cystoscopy (N/A, 2/15/2023); and Ankle fracture surgery (Left, 2025).    Assessment  Performance deficits / Impairments: Decreased functional mobility ;Decreased ADL status;Decreased balance;Decreased safe awareness;Decreased strength;Decreased endurance;Decreased cognition;Decreased high-level IADLs  Assessment: Pt presents with decreased ADL status secondary to above noted deficits requiring mod A-max Ax2 to perform functional transfers, max A for LB dressing/toilet tasks and min A for wheelchair mngt to perform functional mobility. Pt to benefit from continued therapy services while hospitalized to maximize pt's safety and independence in performing functional tasks. At this time, pt has not demonstrated the functional ability to safely return to prior living arrangements, continued skilled OT intervention recommended prior to an eventual return to

## 2025-01-15 NOTE — PLAN OF CARE
Problem: Discharge Planning  Goal: Discharge to home or other facility with appropriate resources  1/15/2025 0355 by Maggi Summers LPN  Outcome: Progressing     Problem: Safety - Adult  Goal: Free from fall injury  1/15/2025 0355 by Maggi Summers LPN  Outcome: Progressing     Problem: Pain  Goal: Verbalizes/displays adequate comfort level or baseline comfort level  1/15/2025 0355 by Maggi Summers LPN  Outcome: Progressing

## 2025-01-16 VITALS
HEIGHT: 67 IN | WEIGHT: 176.15 LBS | HEART RATE: 71 BPM | BODY MASS INDEX: 27.65 KG/M2 | TEMPERATURE: 98.1 F | SYSTOLIC BLOOD PRESSURE: 143 MMHG | OXYGEN SATURATION: 97 % | RESPIRATION RATE: 16 BRPM | DIASTOLIC BLOOD PRESSURE: 68 MMHG

## 2025-01-16 PROCEDURE — 2500000003 HC RX 250 WO HCPCS: Performed by: INTERNAL MEDICINE

## 2025-01-16 PROCEDURE — 6370000000 HC RX 637 (ALT 250 FOR IP): Performed by: STUDENT IN AN ORGANIZED HEALTH CARE EDUCATION/TRAINING PROGRAM

## 2025-01-16 PROCEDURE — 99239 HOSP IP/OBS DSCHRG MGMT >30: CPT | Performed by: INTERNAL MEDICINE

## 2025-01-16 PROCEDURE — 6370000000 HC RX 637 (ALT 250 FOR IP): Performed by: INTERNAL MEDICINE

## 2025-01-16 PROCEDURE — 6360000002 HC RX W HCPCS: Performed by: INTERNAL MEDICINE

## 2025-01-16 RX ORDER — HYDROCODONE BITARTRATE AND ACETAMINOPHEN 5; 325 MG/1; MG/1
TABLET ORAL
Qty: 28 TABLET | Status: SHIPPED | OUTPATIENT
Start: 2025-01-16 | End: 2025-01-16

## 2025-01-16 RX ORDER — POLYETHYLENE GLYCOL 3350 17 G/17G
17 POWDER, FOR SOLUTION ORAL DAILY
DISCHARGE
Start: 2025-01-17 | End: 2025-02-16

## 2025-01-16 RX ORDER — BISACODYL 5 MG/1
10 TABLET, DELAYED RELEASE ORAL DAILY PRN
DISCHARGE
Start: 2025-01-16

## 2025-01-16 RX ORDER — HYDROCODONE BITARTRATE AND ACETAMINOPHEN 5; 325 MG/1; MG/1
TABLET ORAL
Qty: 30 TABLET | Status: SHIPPED | OUTPATIENT
Start: 2025-01-16 | End: 2025-01-21

## 2025-01-16 RX ORDER — BISACODYL 10 MG
10 SUPPOSITORY, RECTAL RECTAL DAILY PRN
DISCHARGE
Start: 2025-01-16 | End: 2025-02-15

## 2025-01-16 RX ORDER — TAMSULOSIN HYDROCHLORIDE 0.4 MG/1
0.4 CAPSULE ORAL DAILY
DISCHARGE
Start: 2025-01-17

## 2025-01-16 RX ADMIN — HYDROCODONE BITARTRATE AND ACETAMINOPHEN 2 TABLET: 5; 325 TABLET ORAL at 08:54

## 2025-01-16 RX ADMIN — ENOXAPARIN SODIUM 40 MG: 100 INJECTION SUBCUTANEOUS at 10:28

## 2025-01-16 RX ADMIN — HYDROCODONE BITARTRATE AND ACETAMINOPHEN 2 TABLET: 5; 325 TABLET ORAL at 04:01

## 2025-01-16 RX ADMIN — NALOXEGOL OXALATE 12.5 MG: 12.5 TABLET, FILM COATED ORAL at 06:22

## 2025-01-16 RX ADMIN — TIMOLOL MALEATE 1 DROP: 2.5 SOLUTION/ DROPS OPHTHALMIC at 10:28

## 2025-01-16 RX ADMIN — SODIUM CHLORIDE, PRESERVATIVE FREE 10 ML: 5 INJECTION INTRAVENOUS at 10:29

## 2025-01-16 RX ADMIN — LORAZEPAM 1 MG: 1 TABLET ORAL at 00:05

## 2025-01-16 RX ADMIN — TAMSULOSIN HYDROCHLORIDE 0.4 MG: 0.4 CAPSULE ORAL at 10:27

## 2025-01-16 RX ADMIN — ESOMEPRAZOLE MAGNESIUM 40 MG: 40 CAPSULE, DELAYED RELEASE ORAL at 06:22

## 2025-01-16 ASSESSMENT — PAIN DESCRIPTION - DESCRIPTORS: DESCRIPTORS: ACHING;BURNING;DISCOMFORT;THROBBING

## 2025-01-16 ASSESSMENT — PAIN SCALES - GENERAL
PAINLEVEL_OUTOF10: 7
PAINLEVEL_OUTOF10: 0
PAINLEVEL_OUTOF10: 8
PAINLEVEL_OUTOF10: 4

## 2025-01-16 ASSESSMENT — PAIN DESCRIPTION - LOCATION
LOCATION: LEG
LOCATION: ANKLE

## 2025-01-16 ASSESSMENT — PAIN DESCRIPTION - ORIENTATION
ORIENTATION: LEFT
ORIENTATION: LEFT

## 2025-01-16 ASSESSMENT — PAIN SCALES - WONG BAKER: WONGBAKER_NUMERICALRESPONSE: NO HURT

## 2025-01-16 NOTE — PROGRESS NOTES
Post Operative Progress Note    1/16/2025 9:18 AM  Info:   Admit Date: 1/10/2025  PCP: Marva Reynolds DO      Medications:   Scheduled Meds:   naloxegol  12.5 mg Oral QAM AC    tamsulosin  0.4 mg Oral Daily    polyethylene glycol  17 g Oral Daily    esomeprazole  40 mg Oral QAM AC    estrogens (conjugated)  0.625 mg Oral Daily    sodium chloride flush  5-40 mL IntraVENous 2 times per day    enoxaparin  40 mg SubCUTAneous Daily    timolol  1 drop Both Eyes BID    Latanoprostene Bunod  1 drop Ophthalmic Nightly     Continuous Infusions:   sodium chloride       PRN Meds:bisacodyl, HYDROcodone 5 mg - acetaminophen **OR** HYDROcodone 5 mg - acetaminophen, HYDROmorphone, sodium chloride flush, sodium chloride, potassium chloride **OR** potassium alternative oral replacement **OR** potassium chloride, ondansetron **OR** ondansetron, bisacodyl, acetaminophen **OR** acetaminophen, metoprolol, hydrALAZINE, sennosides-docusate sodium, LORazepam        Subjective:   Patient is postop day 3 status post left ankle open reduction internal fixation.  She states that she is doing okay overall.  She states that she had some swelling in the splint was a little bit tight.  She states that they loosened the Ace wrap.  She has no major concerns today other than not knowing where she will be discharged to.    Objective:     Patient Vitals for the past 24 hrs:   BP Temp Temp src Pulse Resp SpO2   01/16/25 0854 -- -- -- -- 18 --   01/16/25 0754 (!) 143/68 98.1 °F (36.7 °C) Oral 71 16 97 %   01/16/25 0431 -- -- -- -- 16 --   01/15/25 2327 136/76 98.6 °F (37 °C) Temporal 82 17 94 %   01/15/25 2133 -- -- -- -- 16 --   01/15/25 2027 120/61 98.6 °F (37 °C) Temporal 80 17 93 %   01/15/25 1709 (!) 156/78 97.7 °F (36.5 °C) Temporal 81 -- 100 %   01/15/25 1702 -- -- -- -- 18 --   01/15/25 1632 -- -- -- -- 16 --   01/15/25 1201 -- -- -- -- 16 --   01/15/25 1131 -- -- -- -- 16 --       Physical exam: Patient is resting supine comfortably in bed.

## 2025-01-16 NOTE — CARE COORDINATION
Request for transport by stretcher @1pm sent to [facility], awaiting confirmation      Rn Report [phone]    Packet:    AVS/MAKI  24hr Mar  H & P  Prescription x 1 [place]    [address] Turcios need for maki and discharge order transport request @ 1pm  Notified Enid JO,   of transport and maki needed.     1101 CM spoke with Sundar @ [facility]< Dereck will transport at `1230pm.  VM to Constantino @ [facility], [place]  LPN updated. On transport time.

## 2025-01-16 NOTE — DISCHARGE SUMMARY
[facility]  Office: [phone]  Jose Hernandez DO, Jeancarlos Ray DO, German Turcios DO, Sunny Doty DO, Oneil Gan MD, Carmen Greene MD, Julianne Marcum MD, Anais Neri MD,  Prince Srinivasan MD, Adriano Wall MD, Alexandr Choi MD,  Héctor Jones DO, Rosalie Dick MD, Deshaun Hopkins MD, Jeronimo Hernandez DO, Emmanuelle Watkins MD,  Benton Rascon DO, Sandra Del Toro MD, Marilee Prater MD, Layla Sosa MD, Alvin Mittal MD,  Yamil Bower MD, Tello Winter MD, Anne Snell MD, Mohsen Chan MD, Anderson Sanders MD, Marianne Washburn MD, Butch London DO, Kvng Dupont MD, Héctor Hernandez MD, Mohsin Reza, MD, Shirley Waterhouse, CNP,  Ceci Mancilla CNP, Butch Renee, CNP,  Lela Christian, [facility], Sabine Johnson, CNP, Kenna Owens, CNP, Tran Jenkins, CNP, Juliana Faust, CNP, Marybel Gonzalez, PA-C, Kaleigh Tristan PA-C, Soumya Ortez, CNP, Damaris Eagle, CNP,  Mabel Ulrich, CNP, Kathe Mccarty, CNP, Jo Kenyon, CNP,  Vicki Redd, CNS, Lazara Thornton, CNP, Marycarmen Ross, CNP,   Chikis Bell, CNP         [facility]   IN-PATIENT SERVICE   [facility]    Discharge Summary     Patient ID: Bibiana Garces  :  1945   MRN: [id number]     ACCOUNT:  [id number]   Patient's PCP: Marva Reynolds DO  Admit Date: 1/10/2025   Discharge Date: 2025     Length of Stay: 6  Code Status:  Full Code  Admitting Physician: Marianne Washburn MD  Discharge Physician: German Turcios DO     Active Discharge Diagnoses:     Hospital Problem Lists:  Principal Problem:    Trimalleolar fracture of ankle, closed, left, initial encounter  Active Problems:    Bladder cancer (HCC)    Degeneration of intervertebral disc of lumbar region    Dyslipidemia    Hiatal hernia    Vitamin D deficiency    Closed fracture of left ankle    Urinary retention  Resolved Problems:    * No resolved hospital problems. *      Admission Condition:  fair     Discharged Condition: stable    Hospital  where to get these medications is not yet available    Ask your nurse or doctor about these medications  bisacodyl 10 MG suppository  bisacodyl 5 MG EC tablet  naloxegol 12.5 MG Tabs tablet  polyethylene glycol 17 g packet  tamsulosin 0.4 MG capsule         No discharge procedures on file.    Time Spent on discharge is  36 mins in patient examination, evaluation, counseling as well as medication reconciliation, prescriptions for required medications, discharge plan and follow up.    Electronically signed by   German Turcios DO  1/16/2025  10:48 AM      Thank you Marva Conner DO for the opportunity to be involved in this patient's care.

## 2025-01-16 NOTE — PLAN OF CARE
Problem: Discharge Planning  Goal: Discharge to home or other facility with appropriate resources  1/16/2025 1204 by Veronica Osborne LPN  Outcome: Adequate for Discharge  1/16/2025 0213 by Maggi Summers LPN  Outcome: Progressing     Problem: Skin/Tissue Integrity  Goal: Absence of new skin breakdown  Description: 1.  Monitor for areas of redness and/or skin breakdown  2.  Assess vascular access sites hourly  3.  Every 4-6 hours minimum:  Change oxygen saturation probe site  4.  Every 4-6 hours:  If on nasal continuous positive airway pressure, respiratory therapy assess nares and determine need for appliance change or resting period.  Outcome: Adequate for Discharge     Problem: Safety - Adult  Goal: Free from fall injury  1/16/2025 1204 by Veronica Osborne LPN  Outcome: Adequate for Discharge  1/16/2025 0213 by Maggi Summers LPN  Outcome: Progressing     Problem: Pain  Goal: Verbalizes/displays adequate comfort level or baseline comfort level  1/16/2025 1204 by Veronica Osborne LPN  Outcome: Adequate for Discharge  1/16/2025 0213 by Maggi Summers LPN  Outcome: Progressing     Problem: ABCDS Injury Assessment  Goal: Absence of physical injury  Outcome: Adequate for Discharge

## 2025-01-16 NOTE — PROGRESS NOTES
[facility] Quality Flow/Interdisciplinary Rounds Progress Note    Quality Flow Rounds held on January 16, 2025 at 0930    Disciplines Attending:  Bedside Nurse, , , Nursing Unit Leadership, and    Barriers to Discharge:   None- Beltre removed, void trial being attempted if unable[facility]     Anticipated Discharge Date:   1/16    Anticipated Discharge Disposition: SNF    [facility] RISK OF UNPLANNED READMISSION 2.0             10.1 Total Score        Discussed patient goal for the day, patient clinical progression, and barriers to discharge.        Marichuy Echevarria RN  January 16, 2025

## 2025-01-16 NOTE — PROGRESS NOTES
[facility]  Office: [phone]  Jose Hernandez DO, Jeancarlos Ray DO, German Turcios DO, Sunny Doty, DO, Oneil Gan MD, Carmen Greene MD, Julianne Marcum MD, Anais Neri MD,  Prince Srinivasan MD, Adriano Wall MD, Alexandr Choi MD,  Héctor Jones DO, Rosalie Dick MD, Deshaun Hopkins MD, Jeronimo Hernandez DO, Emmanuelle Watkins MD,  Benton Rascon DO, Sandra Del Toro MD, Marilee Prater MD, Layla Sosa MD, Alvin Mittal MD,  Yamil Bower MD, Tello Winter MD, Anne Snell MD, Mohsen Chan MD, Anderson Sanders MD, Marianne Washburn MD, Butch London DO, Kvng Dupont MD, Héctor Hernandez MD, Mohsin Reza, MD, Shirley Waterhouse, CNP,  Ceci Mancilla, CNP, uBtch Renee, CNP,  Lela Christian, DNP, Sabine Johnson, CNP, Kenna Owens, CNP, Tran Jenkins, CNP, Juliana Faust, CNP, Marybel Gonzalez, PA-C, Kaleigh Tristan, PA-C, Soumya Ortez, CNP, Damaris Eagle, CNP,  Mabel Ulrich, CNP, Kathe Mccarty, CNP, Jo Kenyon, CNP,  Vicki Redd, CNS, Lazara Thornton, CNP, Marycarmen Ross, CNP,   Chikis Bell, CNP         [facility]   IN-PATIENT SERVICE   [facility]    Progress Note    1/16/2025    7:07 AM    Name:   Bibiana Garces  MRN:     [id number]     Acct:      [id number]   Room:   314/314-01   Day:  6  Admit Date:  1/10/2025  5:57 PM    PCP:   Marva Reynolds DO  Code Status:  Full Code    Subjective:     C/C:   Chief Complaint   Patient presents with    Ankle Pain     Interval History Status: improved.     Patient continues to improve, pain resolution generally well-controlled.  Denies any complaints of chest pain, shortness of breath, nausea or vomiting, fevers or chills.  Constipation has improved    Brief History:     This is a 71-year-old female that presents with ankle pain after a fall at home.  She apparently had gone outside to feed a stray cat and slipped on the curb injuring her ankle.  She presented here and was found to have ankle fracture.  She is  admitted underwent ORIF on 13 January.    Review of Systems:     Constitutional:  negative for chills, fevers, sweats  Respiratory:  negative for cough, dyspnea on exertion, shortness of breath, wheezing  Cardiovascular:  negative for chest pain, chest pressure/discomfort, lower extremity edema, palpitations  Gastrointestinal:  negative for abdominal pain, diarrhea, nausea, vomiting  Neurological:  negative for dizziness, headache    Medications:     Allergies:    Allergies   Allergen Reactions    Antihistamines, Diphenhydramine-Type Other (See Comments)     Bp drops and I pass out. If given in large amount    Biaxin [Clarithromycin] Hives     Abdominal pain    Ceclor [Cefaclor]      Hives and abdominal pain    Ceftin [Cefuroxime] Other (See Comments)     Hives and abdominal pain    Cleocin [Clindamycin] Other (See Comments)     Hives and abdominal pain    Erythromycin      Hives and abdominal pain    Naprelan [Naproxen] Hives    Prednisone Other (See Comments)     Hives and abdominal pain    Relafen [Nabumetone] Other (See Comments)     Hives and abdominal pain    Suprax [Cefixime] Other (See Comments)     Hives and abdominal pain    Zocor [Simvastatin] Other (See Comments)     Abdominal pain       Current Meds:   Scheduled Meds:    naloxegol  12.5 mg Oral QAM AC    tamsulosin  0.4 mg Oral Daily    polyethylene glycol  17 g Oral Daily    esomeprazole  40 mg Oral QAM AC    estrogens (conjugated)  0.625 mg Oral Daily    sodium chloride flush  5-40 mL IntraVENous 2 times per day    enoxaparin  40 mg SubCUTAneous Daily    timolol  1 drop Both Eyes BID    Latanoprostene Bunod  1 drop Ophthalmic Nightly     Continuous Infusions:    sodium chloride       PRN Meds: bisacodyl, HYDROcodone 5 mg - acetaminophen **OR** HYDROcodone 5 mg - acetaminophen, HYDROmorphone, sodium chloride flush, sodium chloride, potassium chloride **OR** potassium alternative oral replacement **OR** potassium chloride, ondansetron **OR**

## 2025-01-28 ENCOUNTER — TELEPHONE (OUTPATIENT)
Age: 80
End: 2025-01-28

## 2025-01-28 DIAGNOSIS — S82.852A TRIMALLEOLAR FRACTURE OF ANKLE, CLOSED, LEFT, INITIAL ENCOUNTER: Primary | ICD-10-CM

## 2025-01-28 NOTE — TELEPHONE ENCOUNTER
Called, patient will need x-ray prior to appointment    [address] is the address, [address]

## 2025-01-29 ENCOUNTER — OFFICE VISIT (OUTPATIENT)
Age: 80
End: 2025-01-29

## 2025-01-29 VITALS — BODY MASS INDEX: 27.62 KG/M2 | HEIGHT: 67 IN | WEIGHT: 176 LBS

## 2025-01-29 DIAGNOSIS — Z48.89 POSTOPERATIVE VISIT: Primary | ICD-10-CM

## 2025-01-29 PROCEDURE — 99024 POSTOP FOLLOW-UP VISIT: CPT

## 2025-01-29 NOTE — PROGRESS NOTES
[facility]    [facility]  [address] [place] [address], [address]  [place] [address]    Surgery:    1/13/2025  Left Ankle Open Reduction Internal Fixation With Synthes - Left    History of Present Illness:    This is a 79 y.o. female who presents to the clinic today for post op follow up for Left Ankle Open Reduction Internal Fixation With Synthes - Left on 1/13/2025 .  Patient states overall she is doing well.  She is currently in a skilled nursing facility.  She has been nonweightbearing.  She has been taking Norco as needed for pain.  She presents today for further evaluation.    Physical Exam:  Left ankle: Incision sites.  Be clean, dry, intact.  There is no erythema around the incision site.  There is no seepage or drainage coming from the incision site.  No signs infectious process ongoing.  She is tight with range of motion today however she is able to perform some plantarflexion dorsiflexion.    Imaging:  3 views left ankle taken outside facility were interpreted today which show status post left ankle open reduction internal fixation.  Hardware appears to be in excellent alignment.  No new acute bony abnormalities appreciated.    Plan:  Patient is doing well.  At this point I want her to remain nonweightbearing.  I did remove the splint today.  Staples were also removed today.  I did give her a cam boot.  She is able to come out of this for hygiene purposes as well as range of motion exercises.  She can continue with her current pain regimen.  As her pain becomes more tolerable, she is able to wean off the Norco completely.  Certainly if she has any questions or concerns for us, she can reach out.  Plan for her to follow-up with Dr. Rivera in 4 weeks for repeat x-ray.  Patient demonstrates understanding and is agreeable to the plan.         Electronically signed by LUPE VEGAS PA-C on 1/29/2025 at 12:23 PM    Please note that this chart was

## 2025-01-30 DIAGNOSIS — S82.852A TRIMALLEOLAR FRACTURE OF ANKLE, CLOSED, LEFT, INITIAL ENCOUNTER: Primary | ICD-10-CM

## 2025-01-30 RX ORDER — OXYCODONE AND ACETAMINOPHEN 5; 325 MG/1; MG/1
1-2 TABLET ORAL EVERY 6 HOURS PRN
Qty: 30 TABLET | Refills: 0 | Status: SHIPPED | OUTPATIENT
Start: 2025-01-30 | End: 2025-02-06

## 2025-01-30 NOTE — TELEPHONE ENCOUNTER
Left ankle ORIF 1/13/25 asking for pain medication to have at home. Patient is being discharged today from the rehab facility

## 2025-02-05 DIAGNOSIS — S82.852A TRIMALLEOLAR FRACTURE OF ANKLE, CLOSED, LEFT, INITIAL ENCOUNTER: ICD-10-CM

## 2025-02-05 RX ORDER — OXYCODONE AND ACETAMINOPHEN 5; 325 MG/1; MG/1
1-2 TABLET ORAL EVERY 8 HOURS PRN
Qty: 30 TABLET | Refills: 0 | Status: SHIPPED | OUTPATIENT
Start: 2025-02-05 | End: 2025-02-12

## 2025-02-12 DIAGNOSIS — S82.852A TRIMALLEOLAR FRACTURE OF ANKLE, CLOSED, LEFT, INITIAL ENCOUNTER: ICD-10-CM

## 2025-02-12 RX ORDER — OXYCODONE AND ACETAMINOPHEN 5; 325 MG/1; MG/1
1-2 TABLET ORAL EVERY 8 HOURS PRN
Qty: 30 TABLET | Refills: 0 | Status: SHIPPED | OUTPATIENT
Start: 2025-02-12 | End: 2025-02-19

## 2025-02-20 DIAGNOSIS — S82.852A TRIMALLEOLAR FRACTURE OF ANKLE, CLOSED, LEFT, INITIAL ENCOUNTER: Primary | ICD-10-CM

## 2025-02-20 RX ORDER — OXYCODONE AND ACETAMINOPHEN 5; 325 MG/1; MG/1
1-2 TABLET ORAL EVERY 8 HOURS PRN
Qty: 30 TABLET | Refills: 0 | Status: SHIPPED | OUTPATIENT
Start: 2025-02-20 | End: 2025-02-27

## 2025-02-20 NOTE — TELEPHONE ENCOUNTER
Left ankle ORIF, patient asking for refill of pain medication. She is 5 weeks out. Are you wanting for this to be her last refill?

## 2025-02-24 ENCOUNTER — HOSPITAL ENCOUNTER (OUTPATIENT)
Age: 80
Discharge: HOME | End: 2025-02-26
Payer: COMMERCIAL

## 2025-02-24 ENCOUNTER — TELEPHONE (OUTPATIENT)
Age: 80
End: 2025-02-24

## 2025-02-24 DIAGNOSIS — S82.852A TRIMALLEOLAR FRACTURE OF ANKLE, CLOSED, LEFT, INITIAL ENCOUNTER: Primary | ICD-10-CM

## 2025-02-24 PROCEDURE — 73610 X-RAY EXAM OF ANKLE: CPT

## 2025-02-24 NOTE — TELEPHONE ENCOUNTER
Phoned patient in regards to getting x-ray taken prior to appointment with provider. Instructed to get x-ray within an hour prior to appointment at: [address]. -- The sign by the road states \"Lab and imaging services\". Order for x-ray has already been placed in system.

## 2025-02-26 ENCOUNTER — OFFICE VISIT (OUTPATIENT)
Age: 80
End: 2025-02-26

## 2025-02-26 VITALS — HEIGHT: 67 IN | WEIGHT: 176 LBS | BODY MASS INDEX: 27.62 KG/M2

## 2025-02-26 DIAGNOSIS — Z48.89 POSTOPERATIVE VISIT: Primary | ICD-10-CM

## 2025-02-26 PROCEDURE — 99024 POSTOP FOLLOW-UP VISIT: CPT | Performed by: ORTHOPAEDIC SURGERY

## 2025-02-26 NOTE — PROGRESS NOTES
[facility]    [facility]  [address] [place] [address], [address]  [place] [address]    Surgery:    1/13/2025  Left Ankle Open Reduction Internal Fixation With Synthes - Left    History of Present Illness:    This is a 79 y.o. female who presents to the clinic today for post op follow up for Left Ankle Open Reduction Internal Fixation With Synthes - Left on 1/13/2025 .  She is doing well postoperatively.    On exam her and skin incisions are well-healed.  Still does have some swelling and stiffness in the foot.    X-rays of the left ankle show she has maintained reduction all hardware is in good position the tibiotalar joint looks to be well reduced.    At this point she is doing well.  Will start weightbearing in the cam boot over the next 2 weeks.  Then she can transition to a normal shoe.  Will start some therapy.  I will see her back in 1 month with repeat x-rays of left ankle.          Electronically signed by Dar Rivera MD on 2/26/2025 at 1:44 PM

## 2025-03-24 DIAGNOSIS — S82.852A TRIMALLEOLAR FRACTURE OF ANKLE, CLOSED, LEFT, INITIAL ENCOUNTER: Primary | ICD-10-CM

## 2025-03-25 ENCOUNTER — HOSPITAL ENCOUNTER (OUTPATIENT)
Age: 80
Discharge: HOME | End: 2025-03-27
Payer: COMMERCIAL

## 2025-03-25 PROCEDURE — 73610 X-RAY EXAM OF ANKLE: CPT

## 2025-03-26 ENCOUNTER — OFFICE VISIT (OUTPATIENT)
Age: 80
End: 2025-03-26

## 2025-03-26 VITALS — HEIGHT: 67 IN | WEIGHT: 176 LBS | BODY MASS INDEX: 27.62 KG/M2

## 2025-03-26 DIAGNOSIS — Z48.89 POSTOPERATIVE VISIT: Primary | ICD-10-CM

## 2025-03-26 PROCEDURE — 99024 POSTOP FOLLOW-UP VISIT: CPT | Performed by: ORTHOPAEDIC SURGERY

## 2025-03-26 NOTE — PROGRESS NOTES
[facility] Orthopedics & Sports Medicine    [facility] Orthopaedics and Sports Medicine  [address], [address]  [place] [address]    Surgery:    1/13/2025  Left Ankle Open Reduction Internal Fixation With Synthes - Left    History of Present Illness:    This is a 79 y.o. female who presents to the clinic today for post op follow up for Left Ankle Open Reduction Internal Fixation With Synthes - Left on 1/13/2025 . She continues to do well overall. She occasionally uses walker to walk longer distances, and she notices mild occasional pain and swelling after she's been on her feet for too long. She's seeing PT routinely and feels like she's progressing.    On exam, incisions are healed with mature scar. Mild swelling and TTP about medial and lateral malleoli. Ankle dorsiflexion to 10 degrees, plantarflexion to about 30.    Repeat radiographs of left ankle re-demonstrate orthopedic hardware that remains in good overall position, no evidence of failure. Alignment is anatomic. Bony consolidation apparent compared to serial imaging.     She may continue WBAT. Discussed taking break from PT and that she may resume at any time as she feels it's beneficial. Plan to return in 6 weeks for re-evaluation and repeat radiographs of left ankle. All questions were answered to patient's satisfaction and patient is amenable to plan.     Attending Physician Statement   I, Dar Rivera MD, have seen and discussed the care of Bibiana Garces  including pertinent history and exam findings, with Resident Physician. I have reviewed the key elements of all parts of the encounter with the Resident Physician at the time of the encounter. I either performed the key elements of the history and physical exam myself or was physically present while the resident physician performed this. I agree with the assessment, plan and orders as documented by the Resident Physician.     Dar Rivera MD   3/26/2025        Electronically signed by

## 2025-05-01 DIAGNOSIS — S82.852A TRIMALLEOLAR FRACTURE OF ANKLE, CLOSED, LEFT, INITIAL ENCOUNTER: Primary | ICD-10-CM

## 2025-05-05 ENCOUNTER — HOSPITAL ENCOUNTER (OUTPATIENT)
Age: 80
Discharge: HOME | End: 2025-05-07
Payer: COMMERCIAL

## 2025-05-05 ENCOUNTER — TELEPHONE (OUTPATIENT)
Age: 80
End: 2025-05-05

## 2025-05-05 PROCEDURE — 73610 X-RAY EXAM OF ANKLE: CPT

## 2025-05-05 NOTE — TELEPHONE ENCOUNTER
Phoned patient in regards to getting x-ray taken prior to appointment with provider. Instructed to get x-ray within an hour prior to appointment at: [address]. -- The sign by the road states \"Lab and imaging services\". Order for x-ray has already been placed in system.

## 2025-05-07 ENCOUNTER — OFFICE VISIT (OUTPATIENT)
Age: 80
End: 2025-05-07
Payer: COMMERCIAL

## 2025-05-07 VITALS — HEIGHT: 67 IN | WEIGHT: 176 LBS | BODY MASS INDEX: 27.62 KG/M2

## 2025-05-07 DIAGNOSIS — Z09 S/P ORTHOPEDIC SURGERY, FOLLOW-UP EXAM: Primary | ICD-10-CM

## 2025-05-07 PROCEDURE — 99213 OFFICE O/P EST LOW 20 MIN: CPT | Performed by: ORTHOPAEDIC SURGERY

## 2025-05-07 PROCEDURE — 1123F ACP DISCUSS/DSCN MKR DOCD: CPT | Performed by: ORTHOPAEDIC SURGERY

## 2025-05-07 PROCEDURE — 1159F MED LIST DOCD IN RCRD: CPT | Performed by: ORTHOPAEDIC SURGERY

## 2025-05-07 NOTE — PROGRESS NOTES
[facility] Orthopedics & Sports Medicine    [facility] Orthopaedics and Sports Medicine  [address], [address]  [place] [address]    Surgery:    1/13/2025  Left Ankle Open Reduction Internal Fixation With Synthes - Left    History of Present Illness:    This is a 79 y.o. female who presents to the clinic today for post op follow up for Left Ankle Open Reduction Internal Fixation With Synthes - Left on 1/13/2025 .  She is doing well.  She states if she tries to be on her feet for longer periods of time such as going around the grocery store she will get soreness and feel more like sitting down and getting off it.  It does swell up throughout the day.  Otherwise she does feel like each week it is improving certainly.    X-rays were reviewed do show that she has maintained reduction to tibiotalar joint looks to be well reduced.  No syndesmotic widening all hardware is in good position.  No evidence of any loosening.  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  There is been no displacement    I discussed with her that overall I think clinically she is doing well and happy to hear that she is improving each week.  She declined any more formal therapy.  I explained to her she may have some chronic swelling given the trauma to this ankle.  I explained to her that I would like to repeat the x-ray in about 6 weeks to evaluate this medial mall.  I think if she has a stable ankle and is improving steadily then even if she has a fibrous nonunion here I would not recommend any further treatment.  Will see her back in 6 weeks with repeat x-rays of the ankle.          Electronically signed by Dar Rivera MD on 5/7/2025 at 4:14 PM

## 2025-06-19 DIAGNOSIS — S82.852A TRIMALLEOLAR FRACTURE OF ANKLE, CLOSED, LEFT, INITIAL ENCOUNTER: Primary | ICD-10-CM

## 2025-06-19 DIAGNOSIS — Z09 S/P ORTHOPEDIC SURGERY, FOLLOW-UP EXAM: ICD-10-CM

## 2025-06-23 ENCOUNTER — HOSPITAL ENCOUNTER (OUTPATIENT)
Age: 80
Discharge: HOME | End: 2025-06-25
Payer: COMMERCIAL

## 2025-06-23 ENCOUNTER — TELEPHONE (OUTPATIENT)
Age: 80
End: 2025-06-23

## 2025-06-23 PROCEDURE — 73610 X-RAY EXAM OF ANKLE: CPT

## 2025-06-23 NOTE — TELEPHONE ENCOUNTER
Phoned patient in regards to getting x-ray taken prior to appointment with provider. Instructed to get x-ray within an hour prior to appointment at: [address]. -- The sign by the road states \"Lab and imaging services\". Order for x-ray has already been placed in system.

## 2025-06-25 ENCOUNTER — OFFICE VISIT (OUTPATIENT)
Age: 80
End: 2025-06-25
Payer: COMMERCIAL

## 2025-06-25 VITALS — HEIGHT: 67 IN | BODY MASS INDEX: 27.62 KG/M2 | WEIGHT: 176 LBS

## 2025-06-25 DIAGNOSIS — Z09 S/P ORTHOPEDIC SURGERY, FOLLOW-UP EXAM: Primary | ICD-10-CM

## 2025-06-25 PROCEDURE — 99214 OFFICE O/P EST MOD 30 MIN: CPT | Performed by: ORTHOPAEDIC SURGERY

## 2025-06-25 PROCEDURE — 1123F ACP DISCUSS/DSCN MKR DOCD: CPT | Performed by: ORTHOPAEDIC SURGERY

## 2025-06-25 PROCEDURE — 1159F MED LIST DOCD IN RCRD: CPT | Performed by: ORTHOPAEDIC SURGERY

## 2025-06-25 NOTE — PROGRESS NOTES
[facility] Orthopedics & Sports Medicine    [facility] Orthopaedics and Sports Medicine  [address]  [place] [address]    Surgery:    1/13/2025  Left Ankle Open Reduction Internal Fixation With Synthes - Left    History of Present Illness:    This is a 79 y.o. female who presents to the clinic today for post op follow up for Left Ankle Open Reduction Internal Fixation With Synthes - Left on 1/13/2025 .  She is doing well.  She states she is improving each month.  She still gets some soreness in the ankle after walking for example around the grocery store.  Otherwise her stamina and the pain are improving as she gets further out from surgery.    On examination her hindfoot alignment is good.  With weightbearing here in the office today she has no deformity about the ankle.  She does certainly have some pes planus at the foot.    X-rays of the left ankle show that she still looks to have a lucency at the medial malleolus fracture site.  Otherwise the tibiotalar joint looks to be well reduced hardware is in stable position no syndesmotic widening.    I discussed with her that overall I think she is continue to improve clinically and I think she will continue to make good improvements even over the course of a year.  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  At this point 6 months out from surgery with continued weightbearing on it and everything to be stable on x-rays I think that this has enough stability as he keep the tibiotalar joint stable.  If she has any increasing pain or concerns we can always see her back with repeat x-ray at that time.  I explained this to her.  At this point she is doing well we can see her back as needed.          Electronically signed by Dar Rivera MD on 6/25/2025 at 11:59 AM

## (undated) DEVICE — GLOVE SURG SZ 65 L12IN THK75MIL DK GRN LTX FREE

## (undated) DEVICE — APPLICATOR MEDICATED 26 CC SOLUTION HI LT ORNG CHLORAPREP

## (undated) DEVICE — HF-RESECTION ELECTRODE PLASMALOOP LOOP, MEDIUM, 24 FR., 12°/16°, ESG TURIS: Brand: OLYMPUS

## (undated) DEVICE — SYRINGE, LUER LOCK, 10ML: Brand: MEDLINE

## (undated) DEVICE — BLANKET WRM W29.9XL79.1IN UP BODY FORC AIR MISTRAL-AIR

## (undated) DEVICE — BIT DRL L110MM DIA2.5MM YEL QUIK CPL W/O STP NONRADIOLUCENT

## (undated) DEVICE — GLOVE SURG SZ 65 L12IN FNGR THK126MIL CRM LTX FREE

## (undated) DEVICE — SOLUTION IRRIG 3000ML STRL H2O USP UROMATIC PLAS CONT

## (undated) DEVICE — ELECTRODE PT RET AD L9FT HI MOIST COND ADH HYDRGEL CORDED

## (undated) DEVICE — SUTURE ABSORBABLE BRAIDED 2-0 CT-1 27 IN UD VICRYL J259H

## (undated) DEVICE — Device

## (undated) DEVICE — YANKAUER,SMOOTH HANDLE,HIGH CAPACITY: Brand: MEDLINE INDUSTRIES, INC.

## (undated) DEVICE — SUTURE VICRYL + SZ 3-0 L36IN ABSRB UD L36MM CT-1 1/2 CIR VCP944H

## (undated) DEVICE — TUBING, SUCTION, 9/32" X 20', STRAIGHT: Brand: MEDLINE INDUSTRIES, INC.

## (undated) DEVICE — DRAPE,U/ SHT,SPLIT,PLAS,STERIL: Brand: MEDLINE

## (undated) DEVICE — GLOVE SURG SZ 8 L12IN THK75MIL DK GRN LTX FREE

## (undated) DEVICE — EVACUATOR URO BLDR W/ ADPT UROVAC

## (undated) DEVICE — PACK PROCEDURE SURG CYSTO SVMMC LF

## (undated) DEVICE — IMPLANTABLE DEVICE
Type: IMPLANTABLE DEVICE | Site: ANKLE | Status: NON-FUNCTIONAL
Removed: 2025-01-13

## (undated) DEVICE — DRAPE SURG W41XL74IN CLR FULL SZ C ARM 3 ADH POLY STRP E

## (undated) DEVICE — GAUZE,SPONGE,FLUFF,6"X6.75",STRL,5/TRAY: Brand: MEDLINE

## (undated) DEVICE — SCREW BNE L40MM DIA4MM CANC S STL PARTIALLY THRD SM HEX
Type: IMPLANTABLE DEVICE | Site: ANKLE | Status: NON-FUNCTIONAL
Removed: 2025-01-13

## (undated) DEVICE — GLOVE ORANGE PI 7 1/2   MSG9075

## (undated) DEVICE — DRAPE,EXTREMITY,89X128,STERILE: Brand: MEDLINE

## (undated) DEVICE — GLOVE ORANGE PI 7   MSG9070

## (undated) DEVICE — SOLUTION IRRIG 1000ML 0.9% SOD CHL USP POUR PLAS BTL

## (undated) DEVICE — GARMENT,MEDLINE,DVT,INT,CALF,MED, GEN2: Brand: MEDLINE

## (undated) DEVICE — INTENDED FOR TISSUE SEPARATION, AND OTHER PROCEDURES THAT REQUIRE A SHARP SURGICAL BLADE TO PUNCTURE OR CUT.: Brand: BARD-PARKER ® CARBON RIB-BACK BLADES

## (undated) DEVICE — PAD,ABDOMINAL,5"X9",ST,LF,25/BX: Brand: MEDLINE INDUSTRIES, INC.

## (undated) DEVICE — SUTURE VICRYL SZ 0 L36IN ABSRB UD L36MM CT-1 1/2 CIR J946H

## (undated) DEVICE — GLOVE ORTHO 7 1/2   MSG9475

## (undated) DEVICE — PROTECTOR ULN NRV PUR FOAM HK LOOP STRP ANATOMICALLY

## (undated) DEVICE — MHPB HAND AND FOOT PACK: Brand: MEDLINE INDUSTRIES, INC.

## (undated) DEVICE — CLOTH PRE OP W9XL10.5IN 2% CHG FRAGRANCE RNS FREE READYPREP

## (undated) DEVICE — BNDG,ELSTC,MATRIX,STRL,6"X5YD,LF,HOOK&LP: Brand: MEDLINE

## (undated) DEVICE — DRAPE,REIN 53X77,STERILE: Brand: MEDLINE

## (undated) DEVICE — GUIDEWIRE ORTH L150MM DIA1.25MM S STL THRD FOR 4MM CANN SCR

## (undated) DEVICE — BIT DRL L125MM DIA2MM ORNG QUIK CPL W/O STP NONRADIOPAQUE

## (undated) DEVICE — GLOVE SURG SZ 65 THK91MIL LTX FREE SYN POLYISOPRENE

## (undated) DEVICE — PADDING CAST W6INXL4YD COT LO LINTING WYTEX